# Patient Record
Sex: MALE | Race: WHITE | NOT HISPANIC OR LATINO | Employment: OTHER | URBAN - NONMETROPOLITAN AREA
[De-identification: names, ages, dates, MRNs, and addresses within clinical notes are randomized per-mention and may not be internally consistent; named-entity substitution may affect disease eponyms.]

---

## 2017-12-15 ENCOUNTER — HOSPITAL ENCOUNTER (INPATIENT)
Facility: HOSPITAL | Age: 72
LOS: 4 days | Discharge: ANOTHER HOSPITAL | End: 2017-12-19
Attending: PSYCHIATRY & NEUROLOGY | Admitting: PSYCHIATRY & NEUROLOGY

## 2017-12-15 ENCOUNTER — APPOINTMENT (OUTPATIENT)
Dept: GENERAL RADIOLOGY | Facility: HOSPITAL | Age: 72
End: 2017-12-15

## 2017-12-15 ENCOUNTER — HOSPITAL ENCOUNTER (EMERGENCY)
Facility: HOSPITAL | Age: 72
Discharge: PSYCHIATRIC OR SUBSTANCE USE FACILITY | End: 2017-12-15
Attending: EMERGENCY MEDICINE | Admitting: EMERGENCY MEDICINE

## 2017-12-15 VITALS
OXYGEN SATURATION: 97 % | HEIGHT: 66 IN | BODY MASS INDEX: 22.5 KG/M2 | RESPIRATION RATE: 18 BRPM | TEMPERATURE: 98 F | SYSTOLIC BLOOD PRESSURE: 122 MMHG | DIASTOLIC BLOOD PRESSURE: 79 MMHG | HEART RATE: 81 BPM | WEIGHT: 140 LBS

## 2017-12-15 DIAGNOSIS — F20.9 SCHIZOPHRENIA, UNSPECIFIED TYPE (HCC): Primary | ICD-10-CM

## 2017-12-15 DIAGNOSIS — D64.9 ANEMIA, UNSPECIFIED TYPE: ICD-10-CM

## 2017-12-15 LAB
ALBUMIN SERPL-MCNC: 4.2 G/DL (ref 3.4–4.8)
ALBUMIN/GLOB SERPL: 1.3 G/DL (ref 1.1–1.8)
ALP SERPL-CCNC: 76 U/L (ref 38–126)
ALT SERPL W P-5'-P-CCNC: 39 U/L (ref 21–72)
AMPHET+METHAMPHET UR QL: NEGATIVE
ANION GAP SERPL CALCULATED.3IONS-SCNC: 11 MMOL/L (ref 5–15)
APAP SERPL-MCNC: <10 MCG/ML (ref 10–30)
AST SERPL-CCNC: 46 U/L (ref 17–59)
BARBITURATES UR QL SCN: NEGATIVE
BASOPHILS # BLD AUTO: 0.02 10*3/MM3 (ref 0–0.2)
BASOPHILS NFR BLD AUTO: 0.2 % (ref 0–2)
BENZODIAZ UR QL SCN: NEGATIVE
BILIRUB SERPL-MCNC: 0.7 MG/DL (ref 0.2–1.3)
BILIRUB UR QL STRIP: NEGATIVE
BUN BLD-MCNC: 13 MG/DL (ref 7–21)
BUN/CREAT SERPL: 12 (ref 7–25)
CALCIUM SPEC-SCNC: 9.1 MG/DL (ref 8.4–10.2)
CANNABINOIDS SERPL QL: NEGATIVE
CHLORIDE SERPL-SCNC: 103 MMOL/L (ref 95–110)
CLARITY UR: CLEAR
CO2 SERPL-SCNC: 25 MMOL/L (ref 22–31)
COCAINE UR QL: NEGATIVE
COLOR UR: YELLOW
CREAT BLD-MCNC: 1.08 MG/DL (ref 0.7–1.3)
DEPRECATED RDW RBC AUTO: 47.3 FL (ref 35.1–43.9)
EOSINOPHIL # BLD AUTO: 0.16 10*3/MM3 (ref 0–0.7)
EOSINOPHIL NFR BLD AUTO: 1.8 % (ref 0–7)
ERYTHROCYTE [DISTWIDTH] IN BLOOD BY AUTOMATED COUNT: 13.6 % (ref 11.5–14.5)
ETHANOL BLD-MCNC: <10 MG/DL (ref 0–10)
ETHANOL UR QL: <0.01 %
GFR SERPL CREATININE-BSD FRML MDRD: 67 ML/MIN/1.73 (ref 42–98)
GLOBULIN UR ELPH-MCNC: 3.2 GM/DL (ref 2.3–3.5)
GLUCOSE BLD-MCNC: 85 MG/DL (ref 60–100)
GLUCOSE UR STRIP-MCNC: NEGATIVE MG/DL
HCT VFR BLD AUTO: 33.2 % (ref 39–49)
HGB BLD-MCNC: 10.9 G/DL (ref 13.7–17.3)
HGB UR QL STRIP.AUTO: NEGATIVE
IMM GRANULOCYTES # BLD: 0.02 10*3/MM3 (ref 0–0.02)
IMM GRANULOCYTES NFR BLD: 0.2 % (ref 0–0.5)
KETONES UR QL STRIP: NEGATIVE
LEUKOCYTE ESTERASE UR QL STRIP.AUTO: NEGATIVE
LYMPHOCYTES # BLD AUTO: 1.36 10*3/MM3 (ref 0.6–4.2)
LYMPHOCYTES NFR BLD AUTO: 15.4 % (ref 10–50)
MCH RBC QN AUTO: 31 PG (ref 26.5–34)
MCHC RBC AUTO-ENTMCNC: 32.8 G/DL (ref 31.5–36.3)
MCV RBC AUTO: 94.3 FL (ref 80–98)
METHADONE UR QL SCN: NEGATIVE
MONOCYTES # BLD AUTO: 1.08 10*3/MM3 (ref 0–0.9)
MONOCYTES NFR BLD AUTO: 12.2 % (ref 0–12)
NEUTROPHILS # BLD AUTO: 6.19 10*3/MM3 (ref 2–8.6)
NEUTROPHILS NFR BLD AUTO: 70.2 % (ref 37–80)
NITRITE UR QL STRIP: NEGATIVE
OPIATES UR QL: NEGATIVE
OXYCODONE UR QL SCN: NEGATIVE
PH UR STRIP.AUTO: 7 [PH] (ref 5–9)
PLATELET # BLD AUTO: 176 10*3/MM3 (ref 150–450)
PMV BLD AUTO: 10.7 FL (ref 8–12)
POTASSIUM BLD-SCNC: 4 MMOL/L (ref 3.5–5.1)
PROT SERPL-MCNC: 7.4 G/DL (ref 6.3–8.6)
PROT UR QL STRIP: NEGATIVE
RBC # BLD AUTO: 3.52 10*6/MM3 (ref 4.37–5.74)
SALICYLATES SERPL-MCNC: <1 MG/DL (ref 10–20)
SODIUM BLD-SCNC: 139 MMOL/L (ref 137–145)
SP GR UR STRIP: 1.01 (ref 1–1.03)
TSH SERPL DL<=0.05 MIU/L-ACNC: 4.06 MIU/ML (ref 0.46–4.68)
UROBILINOGEN UR QL STRIP: NORMAL
WBC NRBC COR # BLD: 8.83 10*3/MM3 (ref 3.2–9.8)
WHOLE BLOOD HOLD SPECIMEN: NORMAL

## 2017-12-15 PROCEDURE — 80307 DRUG TEST PRSMV CHEM ANLYZR: CPT | Performed by: EMERGENCY MEDICINE

## 2017-12-15 PROCEDURE — 99284 EMERGENCY DEPT VISIT MOD MDM: CPT

## 2017-12-15 PROCEDURE — 71010 HC CHEST PA OR AP: CPT

## 2017-12-15 PROCEDURE — 80053 COMPREHEN METABOLIC PANEL: CPT | Performed by: EMERGENCY MEDICINE

## 2017-12-15 PROCEDURE — 81003 URINALYSIS AUTO W/O SCOPE: CPT | Performed by: EMERGENCY MEDICINE

## 2017-12-15 PROCEDURE — 93005 ELECTROCARDIOGRAM TRACING: CPT | Performed by: EMERGENCY MEDICINE

## 2017-12-15 PROCEDURE — 85025 COMPLETE CBC W/AUTO DIFF WBC: CPT | Performed by: EMERGENCY MEDICINE

## 2017-12-15 PROCEDURE — 36415 COLL VENOUS BLD VENIPUNCTURE: CPT

## 2017-12-15 PROCEDURE — 93010 ELECTROCARDIOGRAM REPORT: CPT | Performed by: INTERNAL MEDICINE

## 2017-12-15 PROCEDURE — 84443 ASSAY THYROID STIM HORMONE: CPT | Performed by: EMERGENCY MEDICINE

## 2017-12-15 RX ORDER — OLANZAPINE 5 MG/1
5 TABLET ORAL NIGHTLY
COMMUNITY

## 2017-12-15 RX ORDER — METOPROLOL SUCCINATE 25 MG/1
12.5 TABLET, EXTENDED RELEASE ORAL DAILY
COMMUNITY

## 2017-12-15 RX ORDER — MIRTAZAPINE 30 MG/1
30 TABLET, FILM COATED ORAL NIGHTLY
COMMUNITY

## 2017-12-15 RX ORDER — TAMSULOSIN HYDROCHLORIDE 0.4 MG/1
1 CAPSULE ORAL NIGHTLY
COMMUNITY

## 2017-12-15 RX ORDER — MULTIPLE VITAMINS W/ MINERALS TAB 9MG-400MCG
1 TAB ORAL DAILY
COMMUNITY
End: 2017-12-22 | Stop reason: HOSPADM

## 2017-12-15 RX ORDER — CHLORAL HYDRATE 500 MG
CAPSULE ORAL
COMMUNITY
End: 2017-12-22 | Stop reason: HOSPADM

## 2017-12-15 RX ORDER — SERTRALINE HYDROCHLORIDE 100 MG/1
100 TABLET, FILM COATED ORAL 2 TIMES DAILY
COMMUNITY

## 2017-12-15 RX ORDER — POLYETHYLENE GLYCOL 3350 17 G/17G
17 POWDER, FOR SOLUTION ORAL 2 TIMES DAILY
COMMUNITY

## 2017-12-15 RX ORDER — SENNOSIDES 8.6 MG
TABLET ORAL NIGHTLY
COMMUNITY

## 2017-12-15 RX ORDER — ALBUTEROL SULFATE 90 UG/1
2 AEROSOL, METERED RESPIRATORY (INHALATION) EVERY 6 HOURS PRN
COMMUNITY

## 2017-12-15 RX ORDER — LEVOTHYROXINE SODIUM 0.03 MG/1
25 TABLET ORAL DAILY
COMMUNITY

## 2017-12-15 RX ORDER — OMEPRAZOLE 20 MG/1
20 CAPSULE, DELAYED RELEASE ORAL 2 TIMES DAILY
COMMUNITY

## 2017-12-15 RX ORDER — PRAVASTATIN SODIUM 20 MG
20 TABLET ORAL NIGHTLY
COMMUNITY

## 2017-12-15 RX ORDER — SODIUM PHOSPHATE,MONO-DIBASIC 19G-7G/118
ENEMA (ML) RECTAL
COMMUNITY
End: 2017-12-22 | Stop reason: HOSPADM

## 2017-12-15 RX ORDER — AMIODARONE HYDROCHLORIDE 200 MG/1
200 TABLET ORAL DAILY
COMMUNITY

## 2017-12-15 RX ORDER — ANTACID TABLETS 648 MG/1
648 TABLET, CHEWABLE ORAL DAILY
COMMUNITY
End: 2017-12-22 | Stop reason: HOSPADM

## 2017-12-16 PROBLEM — F32.A DEPRESSION: Status: ACTIVE | Noted: 2017-12-16

## 2017-12-16 LAB
ARTICHOKE IGE QN: 77 MG/DL (ref 1–129)
CHOLEST SERPL-MCNC: 157 MG/DL (ref 0–199)
GLUCOSE P FAST SERPL-MCNC: 82 MG/DL (ref 60–110)
HDLC SERPL-MCNC: 54 MG/DL (ref 60–200)
LDLC/HDLC SERPL: 1.67 {RATIO} (ref 0–3.55)
TRIGL SERPL-MCNC: 64 MG/DL (ref 20–199)

## 2017-12-16 PROCEDURE — 80061 LIPID PANEL: CPT | Performed by: PSYCHIATRY & NEUROLOGY

## 2017-12-16 PROCEDURE — 93005 ELECTROCARDIOGRAM TRACING: CPT | Performed by: PSYCHIATRY & NEUROLOGY

## 2017-12-16 PROCEDURE — 25010000002 DIPHENHYDRAMINE PER 50 MG

## 2017-12-16 PROCEDURE — 25010000002 HALOPERIDOL LACTATE PER 5 MG: Performed by: PSYCHIATRY & NEUROLOGY

## 2017-12-16 PROCEDURE — 82947 ASSAY GLUCOSE BLOOD QUANT: CPT | Performed by: PSYCHIATRY & NEUROLOGY

## 2017-12-16 PROCEDURE — 25010000002 LORAZEPAM PER 2 MG: Performed by: PSYCHIATRY & NEUROLOGY

## 2017-12-16 PROCEDURE — 99232 SBSQ HOSP IP/OBS MODERATE 35: CPT | Performed by: FAMILY MEDICINE

## 2017-12-16 RX ORDER — HALOPERIDOL 5 MG/ML
5 INJECTION INTRAMUSCULAR ONCE
Status: COMPLETED | OUTPATIENT
Start: 2017-12-16 | End: 2017-12-16

## 2017-12-16 RX ORDER — CLONIDINE HYDROCHLORIDE 0.1 MG/1
0.1 TABLET ORAL EVERY 4 HOURS PRN
Status: DISCONTINUED | OUTPATIENT
Start: 2017-12-16 | End: 2017-12-19 | Stop reason: HOSPADM

## 2017-12-16 RX ORDER — HALOPERIDOL 5 MG/ML
5 INJECTION INTRAMUSCULAR ONCE
Status: COMPLETED | OUTPATIENT
Start: 2017-12-17 | End: 2017-12-17

## 2017-12-16 RX ORDER — ALUMINA, MAGNESIA, AND SIMETHICONE 2400; 2400; 240 MG/30ML; MG/30ML; MG/30ML
15 SUSPENSION ORAL EVERY 6 HOURS PRN
Status: DISCONTINUED | OUTPATIENT
Start: 2017-12-16 | End: 2017-12-19 | Stop reason: HOSPADM

## 2017-12-16 RX ORDER — LORAZEPAM 2 MG/ML
2 INJECTION INTRAMUSCULAR ONCE
Status: COMPLETED | OUTPATIENT
Start: 2017-12-16 | End: 2017-12-16

## 2017-12-16 RX ORDER — DIVALPROEX SODIUM 125 MG/1
125 CAPSULE, COATED PELLETS ORAL EVERY 12 HOURS SCHEDULED
Status: DISCONTINUED | OUTPATIENT
Start: 2017-12-16 | End: 2017-12-19 | Stop reason: HOSPADM

## 2017-12-16 RX ORDER — DIPHENHYDRAMINE HYDROCHLORIDE 50 MG/ML
50 INJECTION INTRAMUSCULAR; INTRAVENOUS ONCE
Status: DISCONTINUED | OUTPATIENT
Start: 2017-12-16 | End: 2017-12-16

## 2017-12-16 RX ORDER — LORAZEPAM 2 MG/ML
2 INJECTION INTRAMUSCULAR ONCE
Status: COMPLETED | OUTPATIENT
Start: 2017-12-17 | End: 2017-12-17

## 2017-12-16 RX ORDER — HYDROXYZINE PAMOATE 50 MG/1
50 CAPSULE ORAL EVERY 6 HOURS PRN
Status: DISCONTINUED | OUTPATIENT
Start: 2017-12-16 | End: 2017-12-19 | Stop reason: HOSPADM

## 2017-12-16 RX ORDER — HALOPERIDOL 5 MG/ML
INJECTION INTRAMUSCULAR
Status: DISPENSED
Start: 2017-12-16 | End: 2017-12-17

## 2017-12-16 RX ORDER — LOPERAMIDE HYDROCHLORIDE 2 MG/1
2 CAPSULE ORAL 4 TIMES DAILY PRN
Status: DISCONTINUED | OUTPATIENT
Start: 2017-12-16 | End: 2017-12-19 | Stop reason: HOSPADM

## 2017-12-16 RX ORDER — TRAZODONE HYDROCHLORIDE 50 MG/1
50 TABLET ORAL NIGHTLY PRN
Status: DISCONTINUED | OUTPATIENT
Start: 2017-12-16 | End: 2017-12-19 | Stop reason: HOSPADM

## 2017-12-16 RX ORDER — ACETAMINOPHEN 325 MG/1
650 TABLET ORAL EVERY 4 HOURS PRN
Status: DISCONTINUED | OUTPATIENT
Start: 2017-12-16 | End: 2017-12-19 | Stop reason: HOSPADM

## 2017-12-16 RX ORDER — DIPHENHYDRAMINE HYDROCHLORIDE 50 MG/ML
50 INJECTION INTRAMUSCULAR; INTRAVENOUS ONCE
Status: COMPLETED | OUTPATIENT
Start: 2017-12-17 | End: 2017-12-17

## 2017-12-16 RX ORDER — DIPHENHYDRAMINE HYDROCHLORIDE 50 MG/ML
INJECTION INTRAMUSCULAR; INTRAVENOUS
Status: COMPLETED
Start: 2017-12-16 | End: 2017-12-16

## 2017-12-16 RX ADMIN — TRAZODONE HYDROCHLORIDE 50 MG: 50 TABLET ORAL at 22:23

## 2017-12-16 RX ADMIN — HYDROXYZINE PAMOATE 50 MG: 50 CAPSULE ORAL at 22:23

## 2017-12-16 RX ADMIN — DIPHENHYDRAMINE HYDROCHLORIDE 50 MG: 50 INJECTION INTRAMUSCULAR; INTRAVENOUS at 13:04

## 2017-12-16 RX ADMIN — LORAZEPAM 2 MG: 2 INJECTION INTRAMUSCULAR at 13:02

## 2017-12-16 RX ADMIN — HALOPERIDOL LACTATE 5 MG: 5 INJECTION, SOLUTION INTRAMUSCULAR at 13:05

## 2017-12-16 RX ADMIN — DIVALPROEX SODIUM 125 MG: 125 CAPSULE ORAL at 21:56

## 2017-12-16 NOTE — ED PROVIDER NOTES
"Subjective   HPI Comments: 73yo male pmh significant htn/hyperlipidemia/cad/hypothyroid/atrial fibrillation/schizophrenia/bph/ copd, presents ED for medical clearance for [facility] psych unit secondary to reported aggressive behavior at nursing home.  Pt denies physical complaints at time of exam.  ROS neg si/hi/av hallucination/etoh/illicit.  Pt does report \"mean\" residents at nursing home.    Patient is a 72 y.o. male presenting with mental health disorder.   Mental Health Problem   Presenting symptoms: aggressive behavior    Onset quality:  Sudden  Duration:  1 day  Chronicity:  New      Review of Systems   Unable to perform ROS: Psychiatric disorder       History reviewed. No pertinent past medical history.    No Known Allergies    History reviewed. No pertinent surgical history.    No family history on file.    Social History     Social History   • Marital status:      Spouse name: N/A   • Number of children: N/A   • Years of education: N/A     Social History Main Topics   • Smoking status: None   • Smokeless tobacco: None   • Alcohol use None   • Drug use: None   • Sexual activity: Not Asked     Other Topics Concern   • None     Social History Narrative   • None           Objective   Physical Exam   Constitutional: He is oriented to person, place, and time. He appears well-developed and well-nourished.   HENT:   Head: Normocephalic and atraumatic.   Nose: Nose normal.   Mouth/Throat: Oropharynx is clear and moist.   Eyes: Pupils are equal, round, and reactive to light.   Neck: Neck supple. No JVD present. No tracheal deviation present.   Cardiovascular: Normal rate, S1 normal, S2 normal and normal heart sounds.  An irregularly irregular rhythm present.   Pulmonary/Chest: Effort normal and breath sounds normal. He has no wheezes. He has no rales.   Abdominal: Soft. Bowel sounds are normal. There is no tenderness. There is no rebound and no guarding.   Musculoskeletal: He exhibits edema. He exhibits no " tenderness.   Lymphadenopathy:     He has no cervical adenopathy.   Neurological: He is alert and oriented to person, place, and time. GCS eye subscore is 4. GCS verbal subscore is 5. GCS motor subscore is 6.   Skin: Skin is warm and dry.   Psychiatric: His speech is normal. His affect is blunt. He is slowed. Cognition and memory are normal. He expresses no homicidal and no suicidal ideation.   Nursing note and vitals reviewed.      ECG 12 Lead    Date/Time: 12/15/2017 7:57 PM  Performed by: ARUN POLANCO  Authorized by: ARUN POLANCO   Interpreted by physician  Rhythm: sinus rhythm  Rate: normal  BPM: 83  QRS axis: left  Conduction: conduction normal  ST Segments: ST segments normal  T depression: aVL  Other findings: prolonged QTc interval  Clinical impression: abnormal ECG               ED Course  ED Course      Labs Reviewed   SALICYLATE LEVEL - Abnormal; Notable for the following:        Result Value    Salicylate <1.0 (*)     All other components within normal limits   ACETAMINOPHEN LEVEL - Abnormal; Notable for the following:     Acetaminophen <10.0 (*)     All other components within normal limits   CBC WITH AUTO DIFFERENTIAL - Abnormal; Notable for the following:     RBC 3.52 (*)     Hemoglobin 10.9 (*)     Hematocrit 33.2 (*)     RDW-SD 47.3 (*)     Monocyte % 12.2 (*)     Monocytes, Absolute 1.08 (*)     All other components within normal limits   COMPREHENSIVE METABOLIC PANEL - Normal    Narrative:     The MDRD GFR formula is only valid for adults with stable renal function between ages 18 and 70.   URINALYSIS W/ CULTURE IF INDICATED - Normal    Narrative:     Urine microscopic not indicated.   URINE DRUG SCREEN - Normal    Narrative:     Negative Thresholds For Drugs Screened in Urine:     Amphetamines          500 ng/ml  Barbiturates          200 ng/ml  Benzodiazepines       200 ng/ml  Cocaine               150 ng/ml  Methadone             300 ng/mL  Opiates               300 ng/mL  Oxycodone              100 ng/mL  THC                   20 ng/mL    The normal value for all drugs tested is negative. This report includes final unconfirmed screening results.  A positive result by this assay can be, at your request, sent to the Reference Lab for confirmation by gas chromatography. Unconfirmed results must not be used for non-medical purposes, such as employment or legal testing. Clinical consideration should be applied to any drug of abuse test result, particularly when unconfirmed results are used.   TSH - Normal   ETHANOL   CBC AND DIFFERENTIAL    Narrative:     The following orders were created for panel order CBC & Differential.  Procedure                               Abnormality         Status                     ---------                               -----------         ------                     CBC Auto Differential[[id number]]        Abnormal            Final result                 Please view results for these tests on the individual orders.   EXTRA TUBES    Narrative:     The following orders were created for panel order Extra Tubes.  Procedure                               Abnormality         Status                     ---------                               -----------         ------                     Light Blue Top[[id number]]                                   Final result                 Please view results for these tests on the individual orders.   LIGHT BLUE TOP     Xr Chest 1 View    Result Date: 12/15/2017  Narrative: PORTABLE CHEST HISTORY: COPD. Portable AP upright film of the chest was obtained at 7:23 PM. COMPARISON: None Chronic obstructive pulmonary disease. Linear atelectasis or scar left midlung. No focal infiltrate. Postoperative changes in the sternum and mediastinum. Right-sided pacemaker with leads projected over the right atrium and right ventricle. The heart is not enlarged. The pulmonary vasculature is not increased. No pleural effusion. No pneumothorax. No acute osseous  abnormality.     Impression: CONCLUSION: Chronic obstructive pulmonary disease. Linear atelectasis or scar left midlung. No focal infiltrate. Coronary artery bypass. Right-sided pacemaker. [id number] Electronically signed by:  Bj Armijo MD  12/15/2017 8:10 PM CST Workstation: [id number]    Final diagnoses:   Schizophrenia, unspecified type   Anemia, unspecified type            Guille Hennessy MD  12/15/17 [id number]

## 2017-12-16 NOTE — CONSULTS
CHIEF COMPLAINT/REASON FOR VISIT:  Agitation    HPI:  Patient presented to our ED with the above complaint on December 15 at almost 8 PM.  He was sent to the ER because of aggressive behavior at the nursing home.  The last nursing note we have from December 15 around 9 AM describes him as very elevated mood speaking very loudly and interacting with people pleasantly but exaggerated.  There were no notes of any physically aggressive behavior.    We have fragments of a hospitalization summary on the end of October of this year.  The last was November 17 which noted major depression, a subdural hygroma with no further details and noted that neurology and neurosurgery recommended against anticoagulation but this note is not completed nor signed.    We also have a note from October 30 from a second-year resident did list a urinary tract infection associated with an indwelling urinary catheter, BPH, paroxysmal atrial fibrillation, hypothyroidism, and hypertension.  The hygroma is not listed in this problem list.  We do have a note from October 26 that appears to be a full psychiatric note from a second-year resident, so perhaps this was a psychiatric admission.  Schizophrenia is in some of the problem list but not all of them.  One note does say he was living with his wife at home in [facility] prior to the admission in October.    PROBLEM LIST:  Patient Active Problem List    Diagnosis   • Depression [F32.9]         CURRENT MEDICATIONS:  Prescriptions Prior to Admission   Medication Sig Dispense Refill Last Dose   • albuterol (PROVENTIL HFA;VENTOLIN HFA) 108 (90 Base) MCG/ACT inhaler Inhale 2 puffs Every 6 (Six) Hours As Needed for Wheezing.   12/14/2017 at Unknown time   • amiodarone (PACERONE) 200 MG tablet Take 200 mg by mouth Daily.   12/14/2017 at Unknown time   • calcium carbonate 648 MG tablet tablet Take 648 mg by mouth Daily.   12/14/2017 at Unknown time   • Cholecalciferol (VITAMIN D3) 5000 units capsule  capsule Take 5,000 Units by mouth Daily.   12/14/2017 at Unknown time   • glucosamine-chondroitin 500-400 MG capsule capsule Take  by mouth 3 (Three) Times a Day With Meals.   12/14/2017 at Unknown time   • levothyroxine (SYNTHROID, LEVOTHROID) 25 MCG tablet Take 25 mcg by mouth Daily.   12/14/2017 at Unknown time   • metoprolol succinate XL (TOPROL-XL) 25 MG 24 hr tablet Take 12.5 mg by mouth Daily.   12/14/2017 at Unknown time   • mirtazapine (REMERON) 30 MG tablet Take 30 mg by mouth Every Night.   12/14/2017 at Unknown time   • Multiple Vitamins-Minerals (MULTIVITAMIN WITH MINERALS) tablet tablet Take 1 tablet by mouth Daily.   12/14/2017 at Unknown time   • OLANZapine (zyPREXA) 5 MG tablet Take 5 mg by mouth Every Night.   12/14/2017 at Unknown time   • Omega-3 Fatty Acids (FISH OIL) 1000 MG capsule capsule Take  by mouth Daily With Breakfast.   12/14/2017 at Unknown time   • omeprazole (priLOSEC) 20 MG capsule Take 20 mg by mouth 2 (Two) Times a Day.   12/14/2017 at Unknown time   • polyethylene glycol (MIRALAX) packet Take 17 g by mouth 2 (Two) Times a Day.   12/14/2017 at Unknown time   • pravastatin (PRAVACHOL) 20 MG tablet Take 20 mg by mouth Every Night.   12/14/2017 at Unknown time   • senna (SENOKOT) 8.6 MG tablet tablet Take  by mouth Every Night.   12/14/2017 at Unknown time   • sertraline (ZOLOFT) 100 MG tablet Take 100 mg by mouth 2 (Two) Times a Day.   12/14/2017 at Unknown time   • tamsulosin (FLOMAX) 0.4 MG capsule 24 hr capsule Take 1 capsule by mouth Every Night.   12/14/2017 at Unknown time       ALLERGIES:  Review of patient's allergies indicates no known allergies.      PAST MEDICAL/SURGICAL HISTORY:  Past Medical History:   Diagnosis Date   • Anxiety    • Cancer    • Depression    • Schizoaffective disorder        Past Surgical History:   Procedure Laterality Date   • CARDIAC SURGERY         Review of Systems   Constitutional: Negative for activity change, appetite change, fatigue and  "fever.        Patient does answer negatively to almost all questions, but clearly is not attending to each.   HENT: Negative for congestion, ear discharge, ear pain, facial swelling, hearing loss, nosebleeds, postnasal drip, rhinorrhea, sinus pressure, sore throat, tinnitus and trouble swallowing.    Eyes: Negative for pain, discharge and visual disturbance.   Respiratory: Negative for cough, shortness of breath and wheezing.    Cardiovascular: Negative for chest pain, palpitations and leg swelling.   Gastrointestinal: Negative for abdominal pain, blood in stool, constipation, diarrhea, nausea and vomiting.   Genitourinary: Negative for difficulty urinating, discharge, dysuria, flank pain, frequency, hematuria, penile pain, penile swelling, scrotal swelling, testicular pain and urgency.   Musculoskeletal: Positive for arthralgias. Negative for back pain, joint swelling, myalgias and neck pain.   Skin: Negative for rash and wound.   Neurological: Negative for dizziness, seizures, syncope, weakness, light-headedness and headaches.   Hematological: Negative for adenopathy.       Social History     Social History   • Marital status:      Spouse name: N/A   • Number of children: N/A   • Years of education: N/A     Occupational History   • Not on file.     Social History Main Topics   • Smoking status: Former Smoker   • Smokeless tobacco: Never Used   • Alcohol use Not on file   • Drug use: Not on file   • Sexual activity: Not on file     Other Topics Concern   • Not on file     Social History Narrative       History reviewed. No pertinent family history.          Objective     /63  Pulse 92  Temp 96.9 °F (36.1 °C) (Tympanic)   Resp 18  Ht 167.6 cm (66\")  Wt 63.5 kg (140 lb)  SpO2 96%  BMI 22.6 kg/m2    Physical Exam   Constitutional: He appears well-developed and well-nourished.   HENT:   Head: Normocephalic and atraumatic.   Eyes: Conjunctivae and EOM are normal.   Neck: Normal range of motion. Neck " supple. No thyromegaly present.   Cardiovascular: Normal rate, regular rhythm and normal heart sounds.  Exam reveals no gallop and no friction rub.    No murmur heard.  Pulmonary/Chest: Effort normal and breath sounds normal. No respiratory distress. He has no wheezes. He has no rales.   Abdominal: Soft. He exhibits no distension and no mass. There is no tenderness. There is no rebound and no guarding.   Musculoskeletal: Normal range of motion.   Lymphadenopathy:     He has no cervical adenopathy.   Neurological: He is alert. He has normal strength and normal reflexes. He displays no tremor. He exhibits normal muscle tone. Coordination normal. He displays no Babinski's sign on the right side. He displays no Babinski's sign on the left side.   Reflex Scores:       Tricep reflexes are 2+ on the right side and 2+ on the left side.       Bicep reflexes are 2+ on the right side and 2+ on the left side.       Brachioradialis reflexes are 2+ on the right side and 2+ on the left side.       Patellar reflexes are 2+ on the right side and 2+ on the left side.       Achilles reflexes are 2+ on the right side and 2+ on the left side.  Patient prefers to continue walking and gets agitated with any attempt for him to stop and focus on my request for neurological exam.  Precise testing of cranial nerves or sensation cannot be completed at and full strength cannot be tested but overall cranial nerves otherwise appear intact.   Skin: Skin is warm and dry. No rash noted. No erythema.   Nursing note and vitals reviewed.      Dystonia/Tardive Dyskinesia  Absent  Meningeal Signs  Absent    Diagnostic Studies  CBC, CMP,TSH, UDS, acetaminophen level, salicylate level, ethanol level, U/A all normal except  SALICYLATE LEVEL - Abnormal; Notable for the following:        Result Value      Salicylate <1.0 (*)       All other components within normal limits   ACETAMINOPHEN LEVEL - Abnormal; Notable for the following:      Acetaminophen <10.0  (*)       All other components within normal limits   CBC WITH AUTO DIFFERENTIAL - Abnormal; Notable for the following:      RBC 3.52 (*)       Hemoglobin 10.9 (*)       Hematocrit 33.2 (*)       RDW-SD 47.3 (*)       Monocyte % 12.2 (*)       Monocytes, Absolute 1.08 (*)       All other components within normal limits   COMPREHENSIVE METABOLIC PANEL - Normal     Narrative:      The MDRD GFR formula is only valid for adults with stable renal function between ages 18 and 70.   URINALYSIS W/ CULTURE IF INDICATED - Normal     Narrative:      Urine microscopic not indicated.   URINE DRUG SCREEN - Normal     TSH - Normal   ETHANOL less than 10      Chest x-ray 1 view:   CONCLUSION:  Chronic obstructive pulmonary disease.  Linear atelectasis or scar left midlung.  No focal infiltrate.  Coronary artery bypass.  Right-sided pacemaker.       EKG:  December 15 at almost 8 PM shows normal sinus rhythm left axis deviation QTC of 509.  EKG of December 16 at 7 AM shows atrial pacemaker left anterior fascicular block nonspecific ST-T wave changes and a QTC of 531.      Assessment/Plan     Patient Active Problem List    Diagnosis   • Depression [F32.9]     Atrial fibrillation which appears to be paroxysmal as he appears to be in normal sinus rhythm now.  Previous evaluation and apparently a history of a fall with a subdural hematoma as resulted in no anticoagulation being indicated.    Hygroma status post subdural hematoma, stable by history    BPH    History of CVA with right-sided weakness    Coronary artery disease with pacemaker in place.  At least one EKG did not show the pacer spike    Status post left nephrectomy    Hypertension    Hyperlipidemia    Hypothyroidism, with normal TSH now.    COPD    Anemia      Continue Home Meds as ordered. Mental health and pain issues managed per psychiatry.  Further diagnostic studies or intervention based on hospital course.

## 2017-12-16 NOTE — NURSING NOTE
Behavior   Feeling anxious and Sleep disturbance    Pain 0  AVH no  S/I no  H/I no  Affect anxious    Intervention  Medications reviewed   Assessment complete  Attempt to redirect and sit with patient    Safety considerations given.  Closely monitor for fear of hurting himself or others.         Response  He is exteremly irritated and refuses to be redirected he refuses food and medications.  He throws his food and wants me to give him more things to throw.  He is difficult to stay dressed.  His  came to visit him and he states he has never seen him like this before.  He moves chairs from areas and into others rooms.        Plan  Will promote and reinforce current treatment plan and encourage involvement in care plan goals.   Will provide for safe, calm, quiet environment.  Will promote open communication with staff and foster a trusting/working relationship with patient.   Will promote participation in groups and therapies and independent reflection.

## 2017-12-16 NOTE — NURSING NOTE
Pt. Refused any clonidine PRN, pt. Blood pressure has gone down since and he has no complaints at this time.

## 2017-12-16 NOTE — NURSING NOTE
Patient had been resting well since IM medication but has not woke up and is being intrusive to others. He is eating his supper and he is not throwing things at this time.   I called and spoke with his grandson (nancy) and he states this is all new behavior for him. That he has usually been the opposite of this behavior.    He has attempted to put on a few different pairs of pants on at one time.   His POA is his brother, Cameron, I attempted to call him for consent and it went to voicemail. I left a message and as of now I have not received a call back. Will continue to attempt to connect to him.

## 2017-12-16 NOTE — NURSING NOTE
Patient arrived to the floor per wheel chair escorted by security and an ED tech. Patient is disoriented to time, place, situation. Patient is calm and pleasant but seems to be extremely tired, he demonstrates this by yawning frequently. I left to take care of situation that was going on at the time of his arrival and upon my return to his room for an interview, patient was asleep with audible snoring. Patient is here for depression and being agitated/aggressive with staff and peers.

## 2017-12-16 NOTE — NURSING NOTE
Dr Johns ROS     Patient has a past medical hx of cva with right sided weakness, copd , afib, htn, high cholesterol and CABG      General  NONE    Eyes   glasses/contact lens    ENT/Mouth   None    Cardio   None    Resp   None    GI    None       None    MS    Muscle or Joint weakness    Skin/Hair/Nails   None    Neuro   None

## 2017-12-16 NOTE — PLAN OF CARE
Problem: BH Patient Care Overview (Adult)  Goal: Individualization and Mutuality  Outcome: Ongoing (interventions implemented as appropriate)  Goal: Discharge Needs Assessment  Outcome: Ongoing (interventions implemented as appropriate)    Problem:  Overarching Goals  Goal: Adheres to Safety Considerations for Self and Others  Outcome: Ongoing (interventions implemented as appropriate)  Goal: Optimized Coping Skills in Response to Life Stressors  Outcome: Ongoing (interventions implemented as appropriate)  Goal: Develops/Participates in Therapeutic [place] to Support Successful Transition  Outcome: Ongoing (interventions implemented as appropriate)

## 2017-12-17 PROCEDURE — 25010000002 DIPHENHYDRAMINE PER 50 MG: Performed by: PSYCHIATRY & NEUROLOGY

## 2017-12-17 PROCEDURE — 25010000002 HALOPERIDOL LACTATE PER 5 MG: Performed by: PSYCHIATRY & NEUROLOGY

## 2017-12-17 PROCEDURE — 25010000002 LORAZEPAM PER 2 MG: Performed by: PSYCHIATRY & NEUROLOGY

## 2017-12-17 RX ORDER — MELATONIN
5000 DAILY
Status: DISCONTINUED | OUTPATIENT
Start: 2017-12-17 | End: 2017-12-19 | Stop reason: HOSPADM

## 2017-12-17 RX ORDER — ATORVASTATIN CALCIUM 10 MG/1
10 TABLET, FILM COATED ORAL DAILY
Status: DISCONTINUED | OUTPATIENT
Start: 2017-12-17 | End: 2017-12-19 | Stop reason: HOSPADM

## 2017-12-17 RX ORDER — LEVOTHYROXINE SODIUM 0.03 MG/1
25 TABLET ORAL DAILY
Status: DISCONTINUED | OUTPATIENT
Start: 2017-12-17 | End: 2017-12-19 | Stop reason: HOSPADM

## 2017-12-17 RX ORDER — SENNA PLUS 8.6 MG/1
1 TABLET ORAL NIGHTLY PRN
Status: DISCONTINUED | OUTPATIENT
Start: 2017-12-17 | End: 2017-12-19 | Stop reason: HOSPADM

## 2017-12-17 RX ORDER — PANTOPRAZOLE SODIUM 40 MG/1
40 TABLET, DELAYED RELEASE ORAL
Status: DISCONTINUED | OUTPATIENT
Start: 2017-12-17 | End: 2017-12-19 | Stop reason: HOSPADM

## 2017-12-17 RX ORDER — AMIODARONE HYDROCHLORIDE 200 MG/1
200 TABLET ORAL
Status: DISCONTINUED | OUTPATIENT
Start: 2017-12-17 | End: 2017-12-19 | Stop reason: HOSPADM

## 2017-12-17 RX ORDER — TAMSULOSIN HYDROCHLORIDE 0.4 MG/1
0.4 CAPSULE ORAL DAILY
Status: DISCONTINUED | OUTPATIENT
Start: 2017-12-17 | End: 2017-12-19 | Stop reason: HOSPADM

## 2017-12-17 RX ORDER — METOPROLOL SUCCINATE 25 MG/1
25 TABLET, EXTENDED RELEASE ORAL
Status: DISCONTINUED | OUTPATIENT
Start: 2017-12-17 | End: 2017-12-19 | Stop reason: HOSPADM

## 2017-12-17 RX ORDER — OLANZAPINE 5 MG/1
5 TABLET ORAL NIGHTLY
Status: DISCONTINUED | OUTPATIENT
Start: 2017-12-17 | End: 2017-12-19 | Stop reason: HOSPADM

## 2017-12-17 RX ORDER — MIRTAZAPINE 15 MG/1
30 TABLET, FILM COATED ORAL NIGHTLY
Status: DISCONTINUED | OUTPATIENT
Start: 2017-12-17 | End: 2017-12-19 | Stop reason: HOSPADM

## 2017-12-17 RX ADMIN — VITAMIN D, TAB 1000IU (100/BT) 5000 UNITS: 25 TAB at 08:59

## 2017-12-17 RX ADMIN — LORAZEPAM 2 MG: 2 INJECTION INTRAMUSCULAR; INTRAVENOUS at 00:11

## 2017-12-17 RX ADMIN — ATORVASTATIN CALCIUM 10 MG: 10 TABLET, FILM COATED ORAL at 09:00

## 2017-12-17 RX ADMIN — HALOPERIDOL LACTATE 5 MG: 5 INJECTION, SOLUTION INTRAMUSCULAR at 00:11

## 2017-12-17 RX ADMIN — PANTOPRAZOLE SODIUM 40 MG: 40 TABLET, DELAYED RELEASE ORAL at 09:18

## 2017-12-17 RX ADMIN — Medication 1 TABLET: at 09:00

## 2017-12-17 RX ADMIN — SERTRALINE HYDROCHLORIDE 50 MG: 50 TABLET ORAL at 09:00

## 2017-12-17 RX ADMIN — TRAZODONE HYDROCHLORIDE 50 MG: 50 TABLET ORAL at 20:43

## 2017-12-17 RX ADMIN — DIPHENHYDRAMINE HYDROCHLORIDE 50 MG: 50 INJECTION INTRAMUSCULAR; INTRAVENOUS at 00:09

## 2017-12-17 RX ADMIN — DIVALPROEX SODIUM 125 MG: 125 CAPSULE ORAL at 09:00

## 2017-12-17 RX ADMIN — HYDROXYZINE PAMOATE 50 MG: 50 CAPSULE ORAL at 09:00

## 2017-12-17 RX ADMIN — LEVOTHYROXINE SODIUM 25 MCG: 25 TABLET ORAL at 09:01

## 2017-12-17 RX ADMIN — DIVALPROEX SODIUM 125 MG: 125 CAPSULE ORAL at 20:43

## 2017-12-17 RX ADMIN — AMIODARONE HYDROCHLORIDE 200 MG: 200 TABLET ORAL at 08:59

## 2017-12-17 RX ADMIN — MIRTAZAPINE 30 MG: 15 TABLET, FILM COATED ORAL at 20:43

## 2017-12-17 RX ADMIN — OLANZAPINE 5 MG: 5 TABLET, FILM COATED ORAL at 20:43

## 2017-12-17 RX ADMIN — POLYETHYLENE GLYCOL 3350 17 G: 17 POWDER, FOR SOLUTION ORAL at 09:00

## 2017-12-17 RX ADMIN — METOPROLOL SUCCINATE 25 MG: 25 TABLET, EXTENDED RELEASE ORAL at 09:00

## 2017-12-17 RX ADMIN — TAMSULOSIN HYDROCHLORIDE 0.4 MG: 0.4 CAPSULE ORAL at 09:00

## 2017-12-17 NOTE — NURSING NOTE
He has been much more quiet and calm today.  He was compliant with medications, he has not been moving and pushing chairs like he had been.  He had a shower today and was compliant with care. His wife called and states that she hopes he comes home soon (nursing home) so that she can visit.    She states she has attempted to contact the brother (Guardian) and has not had any luck as well.   He took his medications and wanted to drink a full cup of water with each pill (there is about 10) and then he would turn the cup over, almost as a ritual.  He is still confused, but he does try to talk to others. He did make a joke this morning that was appropriate.    Continue to monitor and provide for needs.

## 2017-12-17 NOTE — PLAN OF CARE
Problem: Depression  Goal: Treatment Goal: Demonstrate behavioral control of depressive symptoms, verbalize feelings of improved mood/affect, and adopt new coping skills prior to discharge  Outcome: Ongoing (interventions implemented as appropriate)  Goal: Verbalize thoughts and feelings associated with:  Outcome: Ongoing (interventions implemented as appropriate)  Goal: Refrain from harming self  Outcome: Ongoing (interventions implemented as appropriate)  Goal: Refrain from isolation  Outcome: Ongoing (interventions implemented as appropriate)  Goal: Refrain from self-neglect  Outcome: Ongoing (interventions implemented as appropriate)  Goal: Attend and participate in unit activities, including therapeutic, recreational, and educational groups  Outcome: Ongoing (interventions implemented as appropriate)  Goal: Complete daily ADLs, including personal hygiene independently, as able  Outcome: Ongoing (interventions implemented as appropriate)

## 2017-12-17 NOTE — PLAN OF CARE
Problem: BH Patient Care Overview (Adult)  Goal: Individualization and Mutuality  Outcome: Ongoing (interventions implemented as appropriate)  Goal: Discharge Needs Assessment  Outcome: Ongoing (interventions implemented as appropriate)    Problem:  Overarching Goals  Goal: Adheres to Safety Considerations for Self and Others  Outcome: Ongoing (interventions implemented as appropriate)  Goal: Optimized Coping Skills in Response to Life Stressors  Outcome: Ongoing (interventions implemented as appropriate)  Goal: Develops/Participates in Therapeutic [place] to Support Successful Transition  Outcome: Ongoing (interventions implemented as appropriate)

## 2017-12-17 NOTE — NURSING NOTE
Patient has been very energetic today. He moves around and is taking chairs and blankets and moving them around. He is eating and taking his medications today. His wife stated that she wants him back at [place] because he is too far away here.

## 2017-12-17 NOTE — NURSING NOTE
Behavior   Feeling anxious  insomnia  Pain 0  AVH no  S/I no  H/I no  Affect flat    Patient is very disruptive with the other patients tonight. He's moving all of the chairs in the dayroom all over it. He has attempted to take things from his peers that doesn't belong to him. He has requested water a couple of times but he drinks most of the water then when he's done just throws the rest of the water onto the floor. He has taken all of the linen off his bed and placed it all over the dayroom even in front of patient's rooms. His speech is clear/loud most of the time, appearance is unkempt, makes appropriate eye contact. The MD is here in the dayroom now to see this patient .                Intervention  Medications reviewed and administered  Assessment complete    Response  Verbalized understanding   Took medications  Interacted with assessment    Plan  Will promote and reinforce current treatment plan and encourage involvement in care plan goals.   Will provide for safe, calm, quiet environment.  Will promote open communication with staff and foster a trusting/working relationship with patient.   Will promote participation in groups and therapies and independent reflection.

## 2017-12-18 ENCOUNTER — APPOINTMENT (OUTPATIENT)
Dept: GENERAL RADIOLOGY | Facility: HOSPITAL | Age: 72
End: 2017-12-18

## 2017-12-18 VITALS
OXYGEN SATURATION: 95 % | HEART RATE: 92 BPM | WEIGHT: 140 LBS | RESPIRATION RATE: 20 BRPM | SYSTOLIC BLOOD PRESSURE: 166 MMHG | HEIGHT: 66 IN | TEMPERATURE: 97.3 F | BODY MASS INDEX: 22.5 KG/M2 | DIASTOLIC BLOOD PRESSURE: 86 MMHG

## 2017-12-18 PROBLEM — F03.918 DEMENTIA WITH BEHAVIORAL DISTURBANCE (HCC): Status: ACTIVE | Noted: 2017-12-18

## 2017-12-18 PROCEDURE — 73501 X-RAY EXAM HIP UNI 1 VIEW: CPT

## 2017-12-18 PROCEDURE — 99232 SBSQ HOSP IP/OBS MODERATE 35: CPT | Performed by: PSYCHIATRY & NEUROLOGY

## 2017-12-18 RX ADMIN — METOPROLOL SUCCINATE 25 MG: 25 TABLET, EXTENDED RELEASE ORAL at 08:10

## 2017-12-18 RX ADMIN — ATORVASTATIN CALCIUM 10 MG: 10 TABLET, FILM COATED ORAL at 08:10

## 2017-12-18 RX ADMIN — Medication 1 TABLET: at 08:10

## 2017-12-18 RX ADMIN — DIVALPROEX SODIUM 125 MG: 125 CAPSULE ORAL at 20:19

## 2017-12-18 RX ADMIN — OLANZAPINE 5 MG: 5 TABLET, FILM COATED ORAL at 20:19

## 2017-12-18 RX ADMIN — PANTOPRAZOLE SODIUM 40 MG: 40 TABLET, DELAYED RELEASE ORAL at 08:10

## 2017-12-18 RX ADMIN — LEVOTHYROXINE SODIUM 25 MCG: 25 TABLET ORAL at 08:10

## 2017-12-18 RX ADMIN — TRAZODONE HYDROCHLORIDE 50 MG: 50 TABLET ORAL at 20:19

## 2017-12-18 RX ADMIN — DIVALPROEX SODIUM 125 MG: 125 CAPSULE ORAL at 08:10

## 2017-12-18 RX ADMIN — MIRTAZAPINE 30 MG: 15 TABLET, FILM COATED ORAL at 20:19

## 2017-12-18 RX ADMIN — SERTRALINE HYDROCHLORIDE 50 MG: 50 TABLET ORAL at 08:10

## 2017-12-18 RX ADMIN — AMIODARONE HYDROCHLORIDE 200 MG: 200 TABLET ORAL at 08:09

## 2017-12-18 RX ADMIN — VITAMIN D, TAB 1000IU (100/BT) 5000 UNITS: 25 TAB at 08:10

## 2017-12-18 RX ADMIN — TAMSULOSIN HYDROCHLORIDE 0.4 MG: 0.4 CAPSULE ORAL at 08:10

## 2017-12-18 NOTE — NURSING NOTE
"Behavior   Anxiety: Patient denies anxiety  Depression: Patient denies depression  Pain   0  AVH   Patient denies AVH  S/I   no  H/I   no  Affect   calm and pleasant    Patient assessed in room.  Incontinence care is provided and brief changed; patient is calm and cooperative with care.  Patient seems to be in a happy mood; he is laughing and smiling.  He reports having a good day and states he ate well; apart from breakfast.  He is compliant with medication pass and inquires about his medications appropriately.  He is alert to his person, , current date, and scenario tonight.  He states, \"I'm in [facility], today is December the ..\"    No new needs at this time.    Intervention  Medications reviewed and administered  Assessment complete    Response  Verbalized understanding   Took medications  Interacted with assessment    Plan  Will promote and reinforce current treatment plan and encourage involvement in care plan goals.   Will provide for safe, calm, quiet environment.  Will promote open communication with staff and foster a trusting/working relationship with patient.   Will promote participation in groups and therapies and independent reflection.      "

## 2017-12-18 NOTE — NURSING NOTE
Behavior   Anxiety: Difficulty concentrating  Depression: difficulty concentrating  Pain  0  AVH   no  S/I   no  H/I   no    Affect   calm and pleasant    Note:      Intervention  Instructed in medication usage and effects  Medications administered as ordered  Encouraged to verbalize needs      Response  Verbalized understanding   Did patient take medications as ordered yes   Did patient interact with assessment?  yes     Plan  Will monitor for safety  Will monitor every 15 minutes as ordered  Will evaluate and promote the plan of care

## 2017-12-18 NOTE — PLAN OF CARE
Problem: BH Patient Care Overview (Adult)  Goal: Individualization and Mutuality  Outcome: Ongoing (interventions implemented as appropriate)  Goal: Discharge Needs Assessment  Outcome: Ongoing (interventions implemented as appropriate)    Problem:  Overarching Goals  Goal: Adheres to Safety Considerations for Self and Others  Outcome: Ongoing (interventions implemented as appropriate)  Goal: Optimized Coping Skills in Response to Life Stressors  Outcome: Ongoing (interventions implemented as appropriate)  Goal: Develops/Participates in Therapeutic [place] to Support Successful Transition  Outcome: Ongoing (interventions implemented as appropriate)

## 2017-12-18 NOTE — PROGRESS NOTES
"The patient was seen for approximately 15 minutes.  He has done much better since yesterday and has been taking his medicines, which he initially refused.  He said he slept well last night.  He states \"always on my shoes and socks.    Past status examination: The patient is cooperative and makes good eye contact.  There is a slight tremor present.  He still tends to hold up fingers to me.  His mood is good.  His affect is bright.  His speech is normal.  His thought form was unremarkable.  His thought content is somewhat difficult to follow.  There is no suicidal ideation.  He does mention a Phan Moore again but he does not say that he wants to hurt this person.  He also states \"I hear voices.\"  He also states he hears Mr. Moore saying \"sometimes that remote disappears.\"  He also says he has heard voices in the nursing home.    Cognition: The patient noticed the Trump and Obama are presidents.  He can abstractly interpret the proverb \"don't cry over spilt milk.\"  He knows an apple and an orange are both fruit.  He can subtract serial threes.  He can recall 3 out of 3 objects immediately and 2 out of 3 objects in 1 minute.  Reliably ability, insight, judgment are poor.  Impulse control is unspecified at this time.    Assessment: Overall better.    Plan: Continue medications as charted.  Inquire from the family regarding recent stressors.  "

## 2017-12-18 NOTE — H&P
"This is a 72-year-old white male who was referred from a nursing home because of depression and aggressive behavior.  His grandson since this is all new behavior for him and he is usually the opposite of this.  The patient said he did not want to go back to the nursing home.  He states that he was in the  and had 3 dishonorable discharge.    Past psychiatric history: Unavailable at this time.    Past medical history: The patient has probable atrial fibrillation.Hygroma and subdural hematoma.  He has a stroke with right-sided weakness.  He has coronary artery disease and is status post pacemaker placement.  He is status post left nephrectomy.  He has hypertension.  He has elevated lipids.  He has hypothyroidism with a normal TSH.      Family history: Unremarkable.    Social history: Patient lives in the nursing home.  His brother is his power of .  Otherwise no further information is available.    Mental status examination.  The patient was seen after he was given some medicines for agitation.  At this point his cooperation was good.  His eye contact was good.  He repeatedly saluted me and held up several fingers.  There may also be pill-rolling tremor.  He says he has not been sleeping well.  His mood is happy.  His affect is fairly bright.  His thought form was unremarkable.  His thought content is difficult to comprehend.  He denies thoughts of hurting himself.  He does say he has a problem with a Phan Moore, the  of a nursing home in [place].  He states \"I like to kill a few people.\"  He does apparently have auditory hallucinations.  On cognitive testing he knows the year and the date of the month.  He knows that apple and an orange are both fruit.  He cannot perform serial threes.    Physical examination: Please see Dr. Johns's note.    Assessment:    Axis I: Dementia NOS.  Probable depressed mood secondary to dementia.    Axis II: No obvious diagnosis.    Axis III: Multiple " medical problems.    Axis IV: A nursing home.    Axis V: Global assessment of functioning 45 now, 47 the past year.    Plan: I will admit to the [facility] with suicide precautions.  As mentioned above he has needed PRN meds it helped significantly.  I will continue his current medicines.  I will add Depakote sprinkles 125 mg twice a day in chocolate pudding.  I would like to get more collateral information.  He will probably need further workup for why he is had a abrupt change in his mental status.  Patient's reliability is poor insight is poor, judgment is poor and impulse control is fair.  Patient's strengths are that he has a supportive family and a place to live.  Patient's weakness is that he has chronic medical conditions any dementing condition.

## 2017-12-19 ENCOUNTER — APPOINTMENT (OUTPATIENT)
Dept: GENERAL RADIOLOGY | Facility: HOSPITAL | Age: 72
End: 2017-12-19

## 2017-12-19 ENCOUNTER — HOSPITAL ENCOUNTER (INPATIENT)
Facility: HOSPITAL | Age: 72
LOS: 3 days | Discharge: NURSING, REHAB OR LONG-TERM CARE | End: 2017-12-22
Attending: INTERNAL MEDICINE | Admitting: INTERNAL MEDICINE

## 2017-12-19 ENCOUNTER — DOCUMENTATION (OUTPATIENT)
Dept: PSYCHIATRY | Facility: HOSPITAL | Age: 72
End: 2017-12-19

## 2017-12-19 ENCOUNTER — ANESTHESIA EVENT (OUTPATIENT)
Dept: PERIOP | Facility: HOSPITAL | Age: 72
End: 2017-12-19

## 2017-12-19 ENCOUNTER — ANESTHESIA (OUTPATIENT)
Dept: PERIOP | Facility: HOSPITAL | Age: 72
End: 2017-12-19

## 2017-12-19 DIAGNOSIS — Z74.09 IMPAIRED PHYSICAL MOBILITY: ICD-10-CM

## 2017-12-19 DIAGNOSIS — S72.042A DISPLACED FRACTURE OF BASE OF NECK OF LEFT FEMUR, INITIAL ENCOUNTER FOR CLOSED FRACTURE (HCC): Primary | ICD-10-CM

## 2017-12-19 PROBLEM — S72.002A CLOSED LEFT HIP FRACTURE (HCC): Status: ACTIVE | Noted: 2017-12-19

## 2017-12-19 PROBLEM — Z86.79 HISTORY OF SUBDURAL HEMATOMA: Status: ACTIVE | Noted: 2017-12-19

## 2017-12-19 PROBLEM — I48.0 PAROXYSMAL ATRIAL FIBRILLATION (HCC): Status: ACTIVE | Noted: 2017-12-19

## 2017-12-19 PROBLEM — Z90.5 H/O UNILATERAL NEPHRECTOMY: Status: ACTIVE | Noted: 2017-12-19

## 2017-12-19 PROBLEM — I10 ESSENTIAL HYPERTENSION: Status: ACTIVE | Noted: 2017-12-19

## 2017-12-19 LAB
ABO GROUP BLD: NORMAL
ALBUMIN SERPL-MCNC: 3.9 G/DL (ref 3.4–4.8)
ALBUMIN/GLOB SERPL: 1.2 G/DL (ref 1.1–1.8)
ALP SERPL-CCNC: 77 U/L (ref 38–126)
ALT SERPL W P-5'-P-CCNC: 31 U/L (ref 21–72)
ANION GAP SERPL CALCULATED.3IONS-SCNC: 13 MMOL/L (ref 5–15)
AST SERPL-CCNC: 49 U/L (ref 17–59)
BILIRUB SERPL-MCNC: 0.6 MG/DL (ref 0.2–1.3)
BLD GP AB SCN SERPL QL: NEGATIVE
BUN BLD-MCNC: 20 MG/DL (ref 7–21)
BUN/CREAT SERPL: 17.5 (ref 7–25)
CALCIUM SPEC-SCNC: 8.8 MG/DL (ref 8.4–10.2)
CHLORIDE SERPL-SCNC: 103 MMOL/L (ref 95–110)
CO2 SERPL-SCNC: 25 MMOL/L (ref 22–31)
CREAT BLD-MCNC: 1.14 MG/DL (ref 0.7–1.3)
DEPRECATED RDW RBC AUTO: 47 FL (ref 35.1–43.9)
ERYTHROCYTE [DISTWIDTH] IN BLOOD BY AUTOMATED COUNT: 13.7 % (ref 11.5–14.5)
GFR SERPL CREATININE-BSD FRML MDRD: 63 ML/MIN/1.73 (ref 60–98)
GLOBULIN UR ELPH-MCNC: 3.2 GM/DL (ref 2.3–3.5)
GLUCOSE BLD-MCNC: 92 MG/DL (ref 60–100)
HCT VFR BLD AUTO: 31.5 % (ref 39–49)
HGB BLD-MCNC: 10.5 G/DL (ref 13.7–17.3)
INR PPP: 1.09 (ref 0.8–1.2)
Lab: NORMAL
MCH RBC QN AUTO: 31.4 PG (ref 26.5–34)
MCHC RBC AUTO-ENTMCNC: 33.3 G/DL (ref 31.5–36.3)
MCV RBC AUTO: 94.3 FL (ref 80–98)
PLATELET # BLD AUTO: 164 10*3/MM3 (ref 150–450)
PMV BLD AUTO: 11 FL (ref 8–12)
POTASSIUM BLD-SCNC: 4.1 MMOL/L (ref 3.5–5.1)
PROT SERPL-MCNC: 7.1 G/DL (ref 6.3–8.6)
PROTHROMBIN TIME: 14 SECONDS (ref 11.1–15.3)
RBC # BLD AUTO: 3.34 10*6/MM3 (ref 4.37–5.74)
RH BLD: POSITIVE
SODIUM BLD-SCNC: 141 MMOL/L (ref 137–145)
WBC NRBC COR # BLD: 9.22 10*3/MM3 (ref 3.2–9.8)

## 2017-12-19 PROCEDURE — C1713 ANCHOR/SCREW BN/BN,TIS/BN: HCPCS | Performed by: ORTHOPAEDIC SURGERY

## 2017-12-19 PROCEDURE — 85027 COMPLETE CBC AUTOMATED: CPT | Performed by: INTERNAL MEDICINE

## 2017-12-19 PROCEDURE — 25010000002 FENTANYL CITRATE (PF) 100 MCG/2ML SOLUTION: Performed by: NURSE ANESTHETIST, CERTIFIED REGISTERED

## 2017-12-19 PROCEDURE — 86900 BLOOD TYPING SEROLOGIC ABO: CPT

## 2017-12-19 PROCEDURE — 73501 X-RAY EXAM HIP UNI 1 VIEW: CPT

## 2017-12-19 PROCEDURE — 86901 BLOOD TYPING SEROLOGIC RH(D): CPT

## 2017-12-19 PROCEDURE — C1776 JOINT DEVICE (IMPLANTABLE): HCPCS | Performed by: ORTHOPAEDIC SURGERY

## 2017-12-19 PROCEDURE — 25010000002 DEXAMETHASONE PER 1 MG: Performed by: NURSE ANESTHETIST, CERTIFIED REGISTERED

## 2017-12-19 PROCEDURE — 99223 1ST HOSP IP/OBS HIGH 75: CPT | Performed by: ORTHOPAEDIC SURGERY

## 2017-12-19 PROCEDURE — 99238 HOSP IP/OBS DSCHRG MGMT 30/<: CPT | Performed by: PSYCHIATRY & NEUROLOGY

## 2017-12-19 PROCEDURE — 25010000002 PROPOFOL 10 MG/ML EMULSION: Performed by: NURSE ANESTHETIST, CERTIFIED REGISTERED

## 2017-12-19 PROCEDURE — 88305 TISSUE EXAM BY PATHOLOGIST: CPT | Performed by: ORTHOPAEDIC SURGERY

## 2017-12-19 PROCEDURE — 25010000002 MORPHINE PER 10 MG: Performed by: INTERNAL MEDICINE

## 2017-12-19 PROCEDURE — 86901 BLOOD TYPING SEROLOGIC RH(D): CPT | Performed by: ANESTHESIOLOGY

## 2017-12-19 PROCEDURE — A9270 NON-COVERED ITEM OR SERVICE: HCPCS | Performed by: INTERNAL MEDICINE

## 2017-12-19 PROCEDURE — 88305 TISSUE EXAM BY PATHOLOGIST: CPT | Performed by: PATHOLOGY

## 2017-12-19 PROCEDURE — 63710000001 OLANZAPINE 5 MG TABLET: Performed by: INTERNAL MEDICINE

## 2017-12-19 PROCEDURE — 27236 TREAT THIGH FRACTURE: CPT | Performed by: ORTHOPAEDIC SURGERY

## 2017-12-19 PROCEDURE — 86850 RBC ANTIBODY SCREEN: CPT | Performed by: ANESTHESIOLOGY

## 2017-12-19 PROCEDURE — 80053 COMPREHEN METABOLIC PANEL: CPT | Performed by: INTERNAL MEDICINE

## 2017-12-19 PROCEDURE — A9270 NON-COVERED ITEM OR SERVICE: HCPCS | Performed by: ORTHOPAEDIC SURGERY

## 2017-12-19 PROCEDURE — 25010000002 SUCCINYLCHOLINE PER 20 MG: Performed by: NURSE ANESTHETIST, CERTIFIED REGISTERED

## 2017-12-19 PROCEDURE — 85610 PROTHROMBIN TIME: CPT | Performed by: INTERNAL MEDICINE

## 2017-12-19 PROCEDURE — 88311 DECALCIFY TISSUE: CPT | Performed by: PATHOLOGY

## 2017-12-19 PROCEDURE — 25010000003 CEFAZOLIN PER 500 MG: Performed by: ORTHOPAEDIC SURGERY

## 2017-12-19 PROCEDURE — 25810000003 SODIUM CHLORIDE 0.9 % WITH KCL 20 MEQ 20-0.9 MEQ/L-% SOLUTION: Performed by: ORTHOPAEDIC SURGERY

## 2017-12-19 PROCEDURE — 63710000001 MIRTAZAPINE 15 MG TABLET: Performed by: INTERNAL MEDICINE

## 2017-12-19 PROCEDURE — 0SRS0JA REPLACEMENT OF LEFT HIP JOINT, FEMORAL SURFACE WITH SYNTHETIC SUBSTITUTE, UNCEMENTED, OPEN APPROACH: ICD-10-PCS | Performed by: ORTHOPAEDIC SURGERY

## 2017-12-19 PROCEDURE — 88311 DECALCIFY TISSUE: CPT | Performed by: ORTHOPAEDIC SURGERY

## 2017-12-19 PROCEDURE — 63710000001 DOCUSATE SODIUM 100 MG CAPSULE: Performed by: ORTHOPAEDIC SURGERY

## 2017-12-19 PROCEDURE — 86900 BLOOD TYPING SEROLOGIC ABO: CPT | Performed by: ANESTHESIOLOGY

## 2017-12-19 PROCEDURE — 63710000001 TAMSULOSIN 0.4 MG CAPSULE: Performed by: INTERNAL MEDICINE

## 2017-12-19 DEVICE — CORAIL HIP SYSTEM CEMENTLESS FEMORAL STEM 12/14 AMT 135 DEGREES KA SIZE 12 HA COATED STANDARD COLLAR
Type: IMPLANTABLE DEVICE | Site: HIP | Status: FUNCTIONAL
Brand: CORAIL

## 2017-12-19 DEVICE — ARTICUL/EZE FEMORAL HEAD DIAMETER 28MM +5 12/14 TAPER
Type: IMPLANTABLE DEVICE | Site: HIP | Status: FUNCTIONAL
Brand: ARTICUL/EZE

## 2017-12-19 DEVICE — SELF CENTERING BI-POLAR HEAD 28MM ID 50MM OD
Type: IMPLANTABLE DEVICE | Site: HIP | Status: FUNCTIONAL
Brand: SELF CENTERING

## 2017-12-19 DEVICE — PRT HIP BIPOL PRIM DEPUY 9525109/9525107: Type: IMPLANTABLE DEVICE | Site: HIP | Status: FUNCTIONAL

## 2017-12-19 RX ORDER — ROCURONIUM BROMIDE 10 MG/ML
INJECTION, SOLUTION INTRAVENOUS AS NEEDED
Status: DISCONTINUED | OUTPATIENT
Start: 2017-12-19 | End: 2017-12-19 | Stop reason: SURG

## 2017-12-19 RX ORDER — NALOXONE HCL 0.4 MG/ML
0.2 VIAL (ML) INJECTION AS NEEDED
Status: DISCONTINUED | OUTPATIENT
Start: 2017-12-19 | End: 2017-12-19 | Stop reason: HOSPADM

## 2017-12-19 RX ORDER — PANTOPRAZOLE SODIUM 40 MG/1
40 TABLET, DELAYED RELEASE ORAL
Status: DISCONTINUED | OUTPATIENT
Start: 2017-12-20 | End: 2017-12-20

## 2017-12-19 RX ORDER — ATORVASTATIN CALCIUM 10 MG/1
10 TABLET, FILM COATED ORAL DAILY
Status: DISCONTINUED | OUTPATIENT
Start: 2017-12-19 | End: 2017-12-22 | Stop reason: HOSPADM

## 2017-12-19 RX ORDER — SUCCINYLCHOLINE CHLORIDE 20 MG/ML
INJECTION INTRAMUSCULAR; INTRAVENOUS AS NEEDED
Status: DISCONTINUED | OUTPATIENT
Start: 2017-12-19 | End: 2017-12-19 | Stop reason: SURG

## 2017-12-19 RX ORDER — SODIUM CHLORIDE, SODIUM GLUCONATE, SODIUM ACETATE, POTASSIUM CHLORIDE AND MAGNESIUM CHLORIDE 526; 502; 368; 37; 30 MG/100ML; MG/100ML; MG/100ML; MG/100ML; MG/100ML
30 INJECTION, SOLUTION INTRAVENOUS CONTINUOUS
Status: DISCONTINUED | OUTPATIENT
Start: 2017-12-19 | End: 2017-12-19

## 2017-12-19 RX ORDER — LEVOTHYROXINE SODIUM 0.03 MG/1
25 TABLET ORAL DAILY
Status: DISCONTINUED | OUTPATIENT
Start: 2017-12-19 | End: 2017-12-22 | Stop reason: HOSPADM

## 2017-12-19 RX ORDER — MORPHINE SULFATE 8 MG/ML
2 INJECTION INTRAMUSCULAR; INTRAVENOUS; SUBCUTANEOUS
Status: DISCONTINUED | OUTPATIENT
Start: 2017-12-19 | End: 2017-12-22 | Stop reason: HOSPADM

## 2017-12-19 RX ORDER — MIRTAZAPINE 15 MG/1
30 TABLET, FILM COATED ORAL NIGHTLY
Status: DISCONTINUED | OUTPATIENT
Start: 2017-12-19 | End: 2017-12-22 | Stop reason: HOSPADM

## 2017-12-19 RX ORDER — SODIUM CHLORIDE AND POTASSIUM CHLORIDE 150; 900 MG/100ML; MG/100ML
100 INJECTION, SOLUTION INTRAVENOUS CONTINUOUS
Status: DISCONTINUED | OUTPATIENT
Start: 2017-12-19 | End: 2017-12-21

## 2017-12-19 RX ORDER — ALBUTEROL SULFATE 90 UG/1
2 AEROSOL, METERED RESPIRATORY (INHALATION) EVERY 4 HOURS PRN
Status: DISCONTINUED | OUTPATIENT
Start: 2017-12-19 | End: 2017-12-19 | Stop reason: CLARIF

## 2017-12-19 RX ORDER — FENTANYL CITRATE 50 UG/ML
INJECTION, SOLUTION INTRAMUSCULAR; INTRAVENOUS AS NEEDED
Status: DISCONTINUED | OUTPATIENT
Start: 2017-12-19 | End: 2017-12-19 | Stop reason: SURG

## 2017-12-19 RX ORDER — FLUMAZENIL 0.1 MG/ML
0.2 INJECTION INTRAVENOUS AS NEEDED
Status: DISCONTINUED | OUTPATIENT
Start: 2017-12-19 | End: 2017-12-19 | Stop reason: HOSPADM

## 2017-12-19 RX ORDER — MELATONIN
5000 DAILY
Status: DISCONTINUED | OUTPATIENT
Start: 2017-12-19 | End: 2017-12-22 | Stop reason: HOSPADM

## 2017-12-19 RX ORDER — TAMSULOSIN HYDROCHLORIDE 0.4 MG/1
0.4 CAPSULE ORAL NIGHTLY
Status: DISCONTINUED | OUTPATIENT
Start: 2017-12-19 | End: 2017-12-22 | Stop reason: HOSPADM

## 2017-12-19 RX ORDER — LIDOCAINE HYDROCHLORIDE 20 MG/ML
INJECTION, SOLUTION INFILTRATION; PERINEURAL AS NEEDED
Status: DISCONTINUED | OUTPATIENT
Start: 2017-12-19 | End: 2017-12-19 | Stop reason: SURG

## 2017-12-19 RX ORDER — ACETAMINOPHEN 325 MG/1
650 TABLET ORAL ONCE AS NEEDED
Status: DISCONTINUED | OUTPATIENT
Start: 2017-12-19 | End: 2017-12-19 | Stop reason: HOSPADM

## 2017-12-19 RX ORDER — PROMETHAZINE HYDROCHLORIDE 25 MG/ML
12.5 INJECTION, SOLUTION INTRAMUSCULAR; INTRAVENOUS EVERY 6 HOURS PRN
Status: DISCONTINUED | OUTPATIENT
Start: 2017-12-19 | End: 2017-12-22 | Stop reason: HOSPADM

## 2017-12-19 RX ORDER — PROPOFOL 10 MG/ML
VIAL (ML) INTRAVENOUS AS NEEDED
Status: DISCONTINUED | OUTPATIENT
Start: 2017-12-19 | End: 2017-12-19 | Stop reason: SURG

## 2017-12-19 RX ORDER — ALBUTEROL SULFATE 2.5 MG/3ML
2.5 SOLUTION RESPIRATORY (INHALATION) EVERY 4 HOURS PRN
Status: DISCONTINUED | OUTPATIENT
Start: 2017-12-19 | End: 2017-12-22 | Stop reason: HOSPADM

## 2017-12-19 RX ORDER — DOCUSATE SODIUM 100 MG/1
100 CAPSULE, LIQUID FILLED ORAL 2 TIMES DAILY
Status: DISCONTINUED | OUTPATIENT
Start: 2017-12-19 | End: 2017-12-22 | Stop reason: HOSPADM

## 2017-12-19 RX ORDER — CALCIUM CARBONATE 200(500)MG
1 TABLET,CHEWABLE ORAL DAILY PRN
Status: DISCONTINUED | OUTPATIENT
Start: 2017-12-19 | End: 2017-12-22 | Stop reason: HOSPADM

## 2017-12-19 RX ORDER — DEXAMETHASONE SODIUM PHOSPHATE 4 MG/ML
INJECTION, SOLUTION INTRA-ARTICULAR; INTRALESIONAL; INTRAMUSCULAR; INTRAVENOUS; SOFT TISSUE AS NEEDED
Status: DISCONTINUED | OUTPATIENT
Start: 2017-12-19 | End: 2017-12-19 | Stop reason: SURG

## 2017-12-19 RX ORDER — HYDROCODONE BITARTRATE AND ACETAMINOPHEN 7.5; 325 MG/1; MG/1
1 TABLET ORAL EVERY 6 HOURS PRN
Status: DISCONTINUED | OUTPATIENT
Start: 2017-12-19 | End: 2017-12-22 | Stop reason: HOSPADM

## 2017-12-19 RX ORDER — SODIUM CHLORIDE 0.9 % (FLUSH) 0.9 %
SYRINGE (ML) INJECTION
Status: COMPLETED
Start: 2017-12-19 | End: 2017-12-19

## 2017-12-19 RX ORDER — AMIODARONE HYDROCHLORIDE 200 MG/1
200 TABLET ORAL DAILY
Status: DISCONTINUED | OUTPATIENT
Start: 2017-12-19 | End: 2017-12-22 | Stop reason: HOSPADM

## 2017-12-19 RX ORDER — LABETALOL HYDROCHLORIDE 5 MG/ML
5 INJECTION, SOLUTION INTRAVENOUS
Status: DISCONTINUED | OUTPATIENT
Start: 2017-12-19 | End: 2017-12-19 | Stop reason: HOSPADM

## 2017-12-19 RX ORDER — MEPERIDINE HYDROCHLORIDE 50 MG/ML
12.5 INJECTION INTRAMUSCULAR; INTRAVENOUS; SUBCUTANEOUS
Status: DISCONTINUED | OUTPATIENT
Start: 2017-12-19 | End: 2017-12-19 | Stop reason: HOSPADM

## 2017-12-19 RX ORDER — EPHEDRINE SULFATE 50 MG/ML
5 INJECTION, SOLUTION INTRAVENOUS ONCE AS NEEDED
Status: DISCONTINUED | OUTPATIENT
Start: 2017-12-19 | End: 2017-12-19 | Stop reason: HOSPADM

## 2017-12-19 RX ORDER — PANTOPRAZOLE SODIUM 40 MG/1
40 TABLET, DELAYED RELEASE ORAL EVERY MORNING
Status: DISCONTINUED | OUTPATIENT
Start: 2017-12-19 | End: 2017-12-22 | Stop reason: HOSPADM

## 2017-12-19 RX ORDER — DIPHENHYDRAMINE HYDROCHLORIDE 50 MG/ML
12.5 INJECTION INTRAMUSCULAR; INTRAVENOUS
Status: DISCONTINUED | OUTPATIENT
Start: 2017-12-19 | End: 2017-12-19 | Stop reason: HOSPADM

## 2017-12-19 RX ORDER — SODIUM CHLORIDE, SODIUM GLUCONATE, SODIUM ACETATE, POTASSIUM CHLORIDE AND MAGNESIUM CHLORIDE 526; 502; 368; 37; 30 MG/100ML; MG/100ML; MG/100ML; MG/100ML; MG/100ML
1000 INJECTION, SOLUTION INTRAVENOUS CONTINUOUS
Status: DISCONTINUED | OUTPATIENT
Start: 2017-12-19 | End: 2017-12-20

## 2017-12-19 RX ORDER — OLANZAPINE 5 MG/1
5 TABLET ORAL NIGHTLY
Status: DISCONTINUED | OUTPATIENT
Start: 2017-12-19 | End: 2017-12-22 | Stop reason: HOSPADM

## 2017-12-19 RX ORDER — POLYETHYLENE GLYCOL 3350 17 G/17G
17 POWDER, FOR SOLUTION ORAL 2 TIMES DAILY
Status: DISCONTINUED | OUTPATIENT
Start: 2017-12-19 | End: 2017-12-22 | Stop reason: HOSPADM

## 2017-12-19 RX ORDER — SUCCINYLCHOLINE CHLORIDE 20 MG/ML
INJECTION INTRAMUSCULAR; INTRAVENOUS AS NEEDED
Status: DISCONTINUED | OUTPATIENT
Start: 2017-12-19 | End: 2017-12-19

## 2017-12-19 RX ORDER — METOPROLOL SUCCINATE 25 MG
12.5 TABLET, EXTENDED RELEASE 24 HR ORAL DAILY
Status: DISCONTINUED | OUTPATIENT
Start: 2017-12-19 | End: 2017-12-22 | Stop reason: HOSPADM

## 2017-12-19 RX ORDER — ACETAMINOPHEN 650 MG/1
650 SUPPOSITORY RECTAL ONCE AS NEEDED
Status: DISCONTINUED | OUTPATIENT
Start: 2017-12-19 | End: 2017-12-19 | Stop reason: HOSPADM

## 2017-12-19 RX ADMIN — FENTANYL CITRATE 50 MCG: 50 INJECTION, SOLUTION INTRAMUSCULAR; INTRAVENOUS at 17:25

## 2017-12-19 RX ADMIN — POTASSIUM CHLORIDE AND SODIUM CHLORIDE 100 ML/HR: 900; 150 INJECTION, SOLUTION INTRAVENOUS at 21:20

## 2017-12-19 RX ADMIN — DOCUSATE SODIUM 100 MG: 100 CAPSULE, LIQUID FILLED ORAL at 22:57

## 2017-12-19 RX ADMIN — FENTANYL CITRATE 100 MCG: 50 INJECTION, SOLUTION INTRAMUSCULAR; INTRAVENOUS at 17:59

## 2017-12-19 RX ADMIN — FENTANYL CITRATE 50 MCG: 50 INJECTION, SOLUTION INTRAMUSCULAR; INTRAVENOUS at 17:46

## 2017-12-19 RX ADMIN — MORPHINE SULFATE 2 MG: 8 INJECTION, SOLUTION INTRAMUSCULAR; INTRAVENOUS at 08:58

## 2017-12-19 RX ADMIN — SODIUM CHLORIDE, SODIUM GLUCONATE, SODIUM ACETATE, POTASSIUM CHLORIDE AND MAGNESIUM CHLORIDE 1000 ML: 526; 502; 368; 37; 30 INJECTION, SOLUTION INTRAVENOUS at 17:30

## 2017-12-19 RX ADMIN — Medication 12.5 MG: at 13:56

## 2017-12-19 RX ADMIN — SUCCINYLCHOLINE CHLORIDE 120 MG: 20 INJECTION, SOLUTION INTRAMUSCULAR; INTRAVENOUS at 16:59

## 2017-12-19 RX ADMIN — SODIUM CHLORIDE, SODIUM GLUCONATE, SODIUM ACETATE, POTASSIUM CHLORIDE AND MAGNESIUM CHLORIDE 1000 ML: 526; 502; 368; 37; 30 INJECTION, SOLUTION INTRAVENOUS at 15:10

## 2017-12-19 RX ADMIN — TAMSULOSIN HYDROCHLORIDE 0.4 MG: 0.4 CAPSULE ORAL at 22:54

## 2017-12-19 RX ADMIN — MORPHINE SULFATE 2 MG: 8 INJECTION, SOLUTION INTRAMUSCULAR; INTRAVENOUS at 05:22

## 2017-12-19 RX ADMIN — MORPHINE SULFATE 2 MG: 8 INJECTION, SOLUTION INTRAMUSCULAR; INTRAVENOUS at 13:56

## 2017-12-19 RX ADMIN — ACETAMINOPHEN 650 MG: 325 TABLET ORAL at 00:27

## 2017-12-19 RX ADMIN — ROCURONIUM BROMIDE 40 MG: 10 INJECTION INTRAVENOUS at 17:09

## 2017-12-19 RX ADMIN — PROPOFOL 100 MG: 10 INJECTION, EMULSION INTRAVENOUS at 16:59

## 2017-12-19 RX ADMIN — OLANZAPINE 5 MG: 5 TABLET, FILM COATED ORAL at 22:53

## 2017-12-19 RX ADMIN — Medication 10 ML: at 10:07

## 2017-12-19 RX ADMIN — MIRTAZAPINE 30 MG: 15 TABLET, FILM COATED ORAL at 22:54

## 2017-12-19 RX ADMIN — SODIUM CHLORIDE, SODIUM GLUCONATE, SODIUM ACETATE, POTASSIUM CHLORIDE AND MAGNESIUM CHLORIDE 500 ML: 526; 502; 368; 37; 30 INJECTION, SOLUTION INTRAVENOUS at 18:37

## 2017-12-19 RX ADMIN — LIDOCAINE HYDROCHLORIDE 100 MG: 20 INJECTION, SOLUTION INFILTRATION; PERINEURAL at 16:59

## 2017-12-19 RX ADMIN — WATER 2 G: 1 INJECTION INTRAMUSCULAR; INTRAVENOUS; SUBCUTANEOUS at 22:54

## 2017-12-19 RX ADMIN — DEXAMETHASONE SODIUM PHOSPHATE 4 MG: 4 INJECTION, SOLUTION INTRAMUSCULAR; INTRAVENOUS at 18:09

## 2017-12-19 NOTE — CONSULTS
Patient Name:  Nickolas Muhammad  Admit Date:  12/19/2017  Consult Date:  12/19/2017      Referring Provider: Tamera Cash MD   Reason for Consultation: left hip fracture    Patient Care Team:  Ilana Hess MD as PCP - General (Internal Medicine)    Chief complaint: left hip pain    Subjective .     History of present illness:  Was on psych unit and fell last night.  Then began complaining of pain.  Severe pain and unable to bear weight  Report is that he was walking with a wheelchair and it got out in front of him.  No numbness or tingling.    Review of Systems:  The following systems were reviewed and negative;  respiratory, cardiovascular and gastrointestinal  All other systems reviewed as negative    History  Past Medical History:   Diagnosis Date   • Anxiety    • Cancer    • Depression    • Schizoaffective disorder    ,   Past Surgical History:   Procedure Laterality Date   • CARDIAC SURGERY     , History reviewed. No pertinent family history.,   Social History   Substance Use Topics   • Smoking status: Former Smoker   • Smokeless tobacco: Never Used   • Alcohol use None   ,   Prescriptions Prior to Admission   Medication Sig Dispense Refill Last Dose   • albuterol (PROVENTIL HFA;VENTOLIN HFA) 108 (90 Base) MCG/ACT inhaler Inhale 2 puffs Every 6 (Six) Hours As Needed for Wheezing.   12/14/2017 at Unknown time   • amiodarone (PACERONE) 200 MG tablet Take 200 mg by mouth Daily.   12/14/2017 at Unknown time   • calcium carbonate 648 MG tablet tablet Take 648 mg by mouth Daily.   12/14/2017 at Unknown time   • Cholecalciferol (VITAMIN D3) 5000 units capsule capsule Take 5,000 Units by mouth Daily.   12/14/2017 at Unknown time   • glucosamine-chondroitin 500-400 MG capsule capsule Take  by mouth 3 (Three) Times a Day With Meals.   Unknown at Unknown time   • levothyroxine (SYNTHROID, LEVOTHROID) 25 MCG tablet Take 25 mcg by mouth Daily.   12/14/2017 at Unknown time   • metoprolol succinate XL (TOPROL-XL) 25 MG  24 hr tablet Take 12.5 mg by mouth Daily.   12/14/2017 at Unknown time   • mirtazapine (REMERON) 30 MG tablet Take 30 mg by mouth Every Night.   12/14/2017 at Unknown time   • Multiple Vitamins-Minerals (MULTIVITAMIN WITH MINERALS) tablet tablet Take 1 tablet by mouth Daily.   12/14/2017 at Unknown time   • OLANZapine (zyPREXA) 5 MG tablet Take 5 mg by mouth Every Night.   12/14/2017 at Unknown time   • Omega-3 Fatty Acids (FISH OIL) 1000 MG capsule capsule Take  by mouth Daily With Breakfast.   12/14/2017 at Unknown time   • omeprazole (priLOSEC) 20 MG capsule Take 20 mg by mouth 2 (Two) Times a Day.   12/14/2017 at Unknown time   • polyethylene glycol (MIRALAX) packet Take 17 g by mouth 2 (Two) Times a Day.   12/14/2017 at Unknown time   • pravastatin (PRAVACHOL) 20 MG tablet Take 20 mg by mouth Every Night.   12/14/2017 at Unknown time   • senna (SENOKOT) 8.6 MG tablet tablet Take  by mouth Every Night.   12/14/2017 at Unknown time   • sertraline (ZOLOFT) 100 MG tablet Take 100 mg by mouth 2 (Two) Times a Day.   12/14/2017 at Unknown time   • tamsulosin (FLOMAX) 0.4 MG capsule 24 hr capsule Take 1 capsule by mouth Every Night.   12/14/2017 at Unknown time    and Allergies:  Review of patient's allergies indicates no known allergies.    Objective     Vital Signs   Temp:  [97 °F (36.1 °C)-98.9 °F (37.2 °C)] 97 °F (36.1 °C)  Heart Rate:  [78-92] 78  Resp:  [18-20] 18  BP: (124-184)/() 184/87          Physical Exam:    General:  Awake, Alert, and oriented and in no apparent distress    Eyes:  PERRLA, no lid lesions    Mouth:  Oral mucosa moist, no gum lesions    Neck:  Trachea midline, no thyroidomegaly    Heart:  RR, no murmur, will, or rub    Lungs:  Good respiratory effort, clear to ausculation bilaterally    Abdomen:  Soft, nontender, nondistended.  No hepatosplenomegaly.    Skin:  Good skin turgor, no skin lesions    Extremities:  Right LE:  Good distal pulses and sensation, nontender, good ROM, stable,  good muscle tone and strength  Left LE:  Good distal pulses and sensation, tender with any motion, shortened and externally rotated.  Good muscle tone, strength and stability deferred due to know fracture.     Results Review:    Xr Chest 1 View    Result Date: 12/15/2017  Narrative: PORTABLE CHEST HISTORY: COPD. Portable AP upright film of the chest was obtained at 7:23 PM. COMPARISON: None Chronic obstructive pulmonary disease. Linear atelectasis or scar left midlung. No focal infiltrate. Postoperative changes in the sternum and mediastinum. Right-sided pacemaker with leads projected over the right atrium and right ventricle. The heart is not enlarged. The pulmonary vasculature is not increased. No pleural effusion. No pneumothorax. No acute osseous abnormality.     Impression: CONCLUSION: Chronic obstructive pulmonary disease. Linear atelectasis or scar left midlung. No focal infiltrate. Coronary artery bypass. Right-sided pacemaker. [id number] Electronically signed by:  Bj Armijo MD  12/15/2017 8:10 PM CST Workstation: Dr. [email or web address]    Xr Hip With Or Without Pelvis 1 View Left    Result Date: 12/19/2017  Narrative: Pelvis and left hip total three view on 12/18/2017 CLINICAL INDICATION: Left hip pain after fall COMPARISON: None FINDINGS: There is an acute, oblique, displaced left subcapital femoral neck fracture. There is lateral and proximal displacement and varus angulation of the distal fracture fragment. The hips are well located. No other fracture is noted. The SI joints are well aligned.     Impression: Acute left subcapital femoral neck fracture. Electronically signed by:  Arnold Abbott  12/19/2017 12:32 AM CST Workstation: [id number]        Lab Results (last 24 hours)     Procedure Component Value Units Date/Time    Comprehensive Metabolic Panel [[id number]] Collected:  12/19/17 0707    Specimen:  Blood Updated:  12/19/17 0714    Protime-INR [[id number]] Collected:  12/19/17 0707    Specimen:  Blood  Updated:  12/19/17 0714    CBC (No Diff) [[id number]]  (Abnormal) Collected:  12/19/17 0707    Specimen:  Blood Updated:  12/19/17 0725     WBC 9.22 10*3/mm3      RBC 3.34 (L) 10*6/mm3      Hemoglobin 10.5 (L) g/dL      Hematocrit 31.5 (L) %      MCV 94.3 fL      MCH 31.4 pg      MCHC 33.3 g/dL      RDW 13.7 %      RDW-SD 47.0 (H) fl      MPV 11.0 fL      Platelets 164 10*3/mm3            I reviewed the patient's new clinical results.  I reviewed the patient's new imaging results and agree with the interpretation.      Assessment/Plan     Principal Problem:    Displaced fracture of base of neck of left femur, initial encounter for closed fracture  Active Problems:    Paroxysmal atrial fibrillation    Essential hypertension    History of subdural hematoma    H/O unilateral nephrectomy      The patient voiced understanding of the risks, benefits, and alternative forms of treatment that were discussed and the patient consents to proceed with surgery.  All risks, benefits and alternatives were discussed.  Risks including to but not exclusive to anesthetic complications, including death, MI, CVA, infection, bleeding DVT, fracture, residual pain and need for future surgery.  This discussion was held with the patient by Ananda Edwards MD and all questions were answered.    Discussed bipolar endoprosthesis left hip.  Also discussed surgery being performed by Dr Talley - patient is agreeable with that.  Discussed mobilization postop.  Remote history of subdural hematoma.  Presence of nephrectomy, HTN, AFIB, and depression make decision making and clinical management more difficult.    Discussed with Dr Talley.    I discussed the patients findings and my recommendations with patient, nursing staff and consulting provider    Ananda Edwards MD  12/19/17  7:43 AM

## 2017-12-19 NOTE — NURSING NOTE
"Patient was found laying in hallway by room 682. Fall was unwitnessed by staff.  Another patient was standing beside him.  Patient stated \"I fell out of the wheelchair\" then states \"I was pushing the wheelchair and my legs flew out from under me\".  Reports that he hit the back of his head.  Patient was assisted back up to chair x3 staff. Once in chair patient c/o hip pain. Vitals taken. Hospitalist on call was notified of fall, hip pain, and pt reports of hitting head (Dr. Cash).  PERRLA, small red area on back of head, no swelling, bruising, or knots.  Xray of left hip ordered. SAFE report completed. Will continue to closely monitor.    "

## 2017-12-19 NOTE — NURSING NOTE
Behavior    Anxiety: no anxiety noted  Depression: No anxiety noted  Pain   0  AVH   no  S/I   no  H/I   no  Affect   calm and pleasant    Patient out in [place] day [place] in wheelchair.   Has been interacting with peers and staff.  Calm and pleasant.  Med compliant.  Orientated to self only.  Asked several times where he was at. Patient has been walking around unit.  No needs voiced.  Will continue to monitor.      Intervention  Medications reviewed and administered  Assessment complete    Response  Verbalized understanding   Took medications  Interacted with assessment    Plan  Will promote and reinforce current treatment plan and encourage involvement in care plan goals.   Will provide for safe, calm, quiet environment.  Will promote open communication with staff and foster a trusting/working relationship with patient.   Will promote participation in groups and therapies and independent reflection.

## 2017-12-19 NOTE — NURSING NOTE
No return phone call from brother at this time. Multiple notes in chart states pt's brother, Cameron, is POA. No legal form present at this time to verify. [facility] to [facility], does have no record of Cameron being the pt's POA. Nursing home states patient signs all of his own consents and forms at the nursing home. Risk management consulted per RN. H&P on admission states pt is alert and oriented x4. Currently, patient continues to be A&O x4. Marnie Cash, Risk Management, states it is okay for patient to sign own consent given current mental status.

## 2017-12-19 NOTE — ANESTHESIA PREPROCEDURE EVALUATION
Anesthesia Evaluation     Patient summary reviewed and Nursing notes reviewed   NPO Solid Status: > 8 hours  NPO Liquid Status: > 8 hours     Airway   Mallampati: II  TM distance: >3 FB  Neck ROM: full  possible difficult intubation  Dental    (+) poor dentation    Comment: Mostly edentulous with remaining dentition in very poor repair.    Pulmonary - normal exam    breath sounds clear to auscultation  (+) a smoker Former, COPD moderate, decreased breath sounds,   Cardiovascular - normal exam    ECG reviewed  Patient on routine beta blocker and Beta blocker given within 24 hours of surgery  Rhythm: regular  Rate: normal    (+) pacemaker interrogated unknown, hypertension, dysrhythmias Atrial Fib, murmur (Grade II/VI systolic murmur.), hyperlipidemia    ROS comment: EKG:Atrial pacemaker.    Neuro/Psych  (+) psychiatric history (According to patient he signs his own consent. Discussed with patient's nurse who will get proper consent. ) Depression,    Dementia: History of possible dementia. Patient oriented to person,place and time.  GI/Hepatic/Renal/Endo    (+)  GERD well controlled, renal disease (Previous nephrectomy;creatinine 1.14), hypothyroidism,     ROS Comment: HGB 10.5 HCT 31.5    Musculoskeletal (-) negative ROS    Abdominal    Substance History - negative use     OB/GYN negative ob/gyn ROS         Other - negative ROS                                     Anesthesia Plan    ASA 4 - emergent     general     intravenous induction   Anesthetic plan and risks discussed with patient.

## 2017-12-19 NOTE — H&P
[facility] Medicine Admission      Date of Admission: 12/19/2017      Primary Care Physician: Ilana Hess MD      Chief Complaint:  Left hip pain     HPI:This is a 72 y old male who was hospitalized in  for depression and aggressive behavior .  Today patient fell , then started complaining of left hip pain then nurse called the night doctor who ordered an xray and there was an acute subcapital femoral neck fracture  On the left       Past Medical History:  has a past medical history of Anxiety; Cancer; Depression; and Schizoaffective disorder.    Past Surgical History:  has a past surgical history that includes Cardiac surgery.    Family History:negative for DM or CAD   Social History:  reports that he has quit smoking. He has never used smokeless tobacco.    Allergies: No Known Allergies    Medications:   Prescriptions Prior to Admission   Medication Sig Dispense Refill Last Dose   • albuterol (PROVENTIL HFA;VENTOLIN HFA) 108 (90 Base) MCG/ACT inhaler Inhale 2 puffs Every 6 (Six) Hours As Needed for Wheezing.   12/14/2017 at Unknown time   • amiodarone (PACERONE) 200 MG tablet Take 200 mg by mouth Daily.   12/14/2017 at Unknown time   • calcium carbonate 648 MG tablet tablet Take 648 mg by mouth Daily.   12/14/2017 at Unknown time   • Cholecalciferol (VITAMIN D3) 5000 units capsule capsule Take 5,000 Units by mouth Daily.   12/14/2017 at Unknown time   • glucosamine-chondroitin 500-400 MG capsule capsule Take  by mouth 3 (Three) Times a Day With Meals.   Unknown at Unknown time   • levothyroxine (SYNTHROID, LEVOTHROID) 25 MCG tablet Take 25 mcg by mouth Daily.   12/14/2017 at Unknown time   • metoprolol succinate XL (TOPROL-XL) 25 MG 24 hr tablet Take 12.5 mg by mouth Daily.   12/14/2017 at Unknown time   • mirtazapine (REMERON) 30 MG tablet Take 30 mg by mouth Every Night.   12/14/2017 at Unknown time   • Multiple Vitamins-Minerals (MULTIVITAMIN WITH MINERALS)  tablet tablet Take 1 tablet by mouth Daily.   12/14/2017 at Unknown time   • OLANZapine (zyPREXA) 5 MG tablet Take 5 mg by mouth Every Night.   12/14/2017 at Unknown time   • Omega-3 Fatty Acids (FISH OIL) 1000 MG capsule capsule Take  by mouth Daily With Breakfast.   12/14/2017 at Unknown time   • omeprazole (priLOSEC) 20 MG capsule Take 20 mg by mouth 2 (Two) Times a Day.   12/14/2017 at Unknown time   • polyethylene glycol (MIRALAX) packet Take 17 g by mouth 2 (Two) Times a Day.   12/14/2017 at Unknown time   • pravastatin (PRAVACHOL) 20 MG tablet Take 20 mg by mouth Every Night.   12/14/2017 at Unknown time   • senna (SENOKOT) 8.6 MG tablet tablet Take  by mouth Every Night.   12/14/2017 at Unknown time   • sertraline (ZOLOFT) 100 MG tablet Take 100 mg by mouth 2 (Two) Times a Day.   12/14/2017 at Unknown time   • tamsulosin (FLOMAX) 0.4 MG capsule 24 hr capsule Take 1 capsule by mouth Every Night.   12/14/2017 at Unknown time       Review of Systems:  Review of Systems   Constitutional: Negative for activity change, appetite change, chills, diaphoresis, fatigue, fever and unexpected weight change.   HENT: Negative.  Negative for congestion, dental problem, drooling, ear discharge, ear pain, facial swelling, hearing loss, mouth sores, nosebleeds, postnasal drip, rhinorrhea, sinus pressure, sneezing, tinnitus, trouble swallowing and voice change.    Eyes: Negative for photophobia and discharge.   Respiratory: Negative for apnea, cough, choking, shortness of breath, wheezing and stridor.    Cardiovascular: Negative for chest pain, palpitations and leg swelling.   Gastrointestinal: Negative for abdominal pain, anal bleeding, blood in stool, constipation, diarrhea, nausea, rectal pain and vomiting.   Endocrine: Negative for cold intolerance, heat intolerance, polydipsia, polyphagia and polyuria.   Genitourinary: Negative for dysuria, enuresis, frequency, hematuria and urgency.   Musculoskeletal: Positive for  arthralgias. Negative for back pain, joint swelling, myalgias, neck pain and neck stiffness.   Skin: Negative for color change, pallor, rash and wound.   Allergic/Immunologic: Negative for food allergies and immunocompromised state.   Neurological: Negative for dizziness, tremors, seizures, syncope, facial asymmetry, speech difficulty, weakness, light-headedness, numbness and headaches.   Hematological: Negative for adenopathy.   Psychiatric/Behavioral: Negative for agitation, behavioral problems, confusion, hallucinations, sleep disturbance and suicidal ideas. The patient is not nervous/anxious.       Otherwise complete ROS is negative except as mentioned above.    Physical Exam:   Temp:  [97.3 °F (36.3 °C)-98.9 °F (37.2 °C)] 98.9 °F (37.2 °C)  Heart Rate:  [81-92] 81  Resp:  [18-20] 18  BP: (124-182)/() 182/112  Physical Exam   Constitutional: He is oriented to person, place, and time. He appears well-developed and well-nourished.   HENT:   Head: Normocephalic and atraumatic.   Eyes: EOM are normal. Pupils are equal, round, and reactive to light.   Neck: Normal range of motion. Neck supple.   Cardiovascular: Normal rate, regular rhythm, normal heart sounds and intact distal pulses.  Exam reveals no gallop and no friction rub.    No murmur heard.  Pulmonary/Chest: Effort normal and breath sounds normal.   Abdominal: Soft. Bowel sounds are normal. He exhibits no distension and no mass. There is no tenderness. There is no guarding.   Musculoskeletal: Normal range of motion.   Neurological: He is alert and oriented to person, place, and time.   Skin: Skin is warm and dry.   Psychiatric: He has a normal mood and affect. His behavior is normal. Judgment and thought content normal.         Results Reviewed:  I have personally reviewed current lab, radiology, and data and agree with results.  Lab Results (last 24 hours)     ** No results found for the last 24 hours. **        Imaging Results (last 24 hours)     **  No results found for the last 24 hours. **            Assessment:    Hospital Problem List     Closed left hip fracture                Plan:admit to med surg   Iv pain medication   Orthopedic consult  Npo after midnight (in case of surgery in am )  Labs in am   Continue home medication as medical course dictates   No blood thinners as he might have surgery and he has a hx of subdural hematoma     I discussed the patients findings and my recommendations with: patient     Tamera Cash MD  12/19/17  4:02 AM

## 2017-12-19 NOTE — NURSING NOTE
History of Present Illness:    Sarah Gurrola is a 55 year old female who recently met Dr. Ramesh. In the middle of June she noted  more frequent irregular heartbeats with a sense of palpitations and flip-flopping in her chest associated with fatigue. EKG was performed at primary care identifying premature ventricular contractions which are unifocal. TSH, magnesium, hemoglobin, and electrolyte panels were drawn and were all normal. Twelve-lead EKG showed no evidence of pre-excitation there was poor R-wave progression across the anteroseptal leads.    This patient has a family history of atrial fibrillation and apparently a niece who has what sounds like an accessory pathway that may need to be ablated at some point. There is no history of sudden death in the family.     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 She works relatively long days. She has 2 cups of coffee in the morning which are half-caf and has recently been identified to have some reactive airway disease. She has been tried on antihistamines particularly Xyzal, which gave her increased palpitations. She is now on Zyrtec which she tolerates better.    Based on this history and findings she was sent for a stress echocardiogram and echocardiogram and is here today to review those results. She also wore event recorder.    Her echocardiogram shows preserved left ventricular and right ventricular function. She has mild mitral regurgitation mild tricusp insufficiency no pulmonary hypertension; aortic valve has some sclerosis with no stenosis, 1+ pulmonic valve regurgitation is present.    Stress echocardiogram showed no evidence of ischemia. She exercised for 8 1/2 minutes on a Merrick protocol with no chest pain. There were no significant arrhythmias induced during exercise. She had no excessive blood pressure response to exercise.    Event monitor noted PVCs--every time she marked it as a sense of a skipped beat. She states over  Radiology on unit obtaining portable xray   the last couple of weeks he seemed to have improved somewhat.    She admits to more stress recently which certainly can be triggers for premature ventricular beats.    I have reviewed all of her test results in detail. We discussed possible pharmacologic therapy for these. However, with reactive airway disease I would not entertain the idea of beta blockers unless they were highly cardioselective such as Bystolic. Because of the improvement in her symptoms lately she wishes to observe.        Impression/Plan:   1. Mild valvular heart disease with 1+ tricuspid, pulmonic and mitral insufficiency and aortic sclerosis. I will message Dr. Ramesh to obtain his opinon about timing of any repeat echocardiogram.  2. No evidence of coronary artery disease preserved LV function 3  3. Premature ventricular contractions, likely the cause of her palpitations. In the setting of normal labs and minimal if any structural cardiac abnormalties we would likely suggest conservative management.  4. Reactive airway disease. She did have some increase in ectopic beats Xyzal--this has been changed to Zyrtec.    I will review the plan with Dr. Ramesh in terms her follow-up. I will message her call her with this information after it had an opportunity to discuss the case with him  It has been a pleasure seeing Jewell ADY Gurrola today. We will see her back in       George Aguillon, MSN, APRN-BC, CNP  Cardiology    No orders of the defined types were placed in this encounter.    No orders of the defined types were placed in this encounter.    There are no discontinued medications.      Encounter Diagnoses   Name Primary?     PVC's (premature ventricular contractions)      Palpitations        CURRENT MEDICATIONS:  Current Outpatient Prescriptions   Medication Sig Dispense Refill     pantoprazole sodium 40 MG tablet Take 40 mg by mouth daily       cetirizine HCl (ZYRTEC ALLERGY) 10 MG CAPS Take by mouth daily       Vitamin D,  Cholecalciferol, 1000 UNITS CAPS Take by mouth daily       guaiFENesin (MUCINEX) 600 MG 12 hr tablet Take 1,200 mg by mouth daily       Nutritional Supplements (BIFIDO-GENIC GROWTH FACTORS PO)        ranitidine (ZANTAC) 150 MG capsule Take 150 mg by mouth every evening       Misc Natural Products (OSTEO BI-FLEX ADV TRIPLE ST PO) Take by mouth daily       montelukast (SINGULAIR) 10 MG tablet Take 10 mg by mouth At Bedtime       albuterol (VENTOLIN HFA) 108 (90 BASE) MCG/ACT Inhaler Inhale 2 puffs into the lungs as needed for shortness of breath / dyspnea or wheezing       FLUoxetine HCl (PROZAC PO) Take 20 mg by mouth daily        Levothyroxine Sodium (SYNTHROID PO) Take 75 mcg by mouth       fluticasone (VERAMYST) 27.5 MCG/SPRAY nasal spray Spray 2 sprays into both nostrils 2 times daily        albuterol (PROVENTIL) (5 MG/ML) 0.5% nebulizer solution Take 2.5 mg by nebulization every 6 hours as needed for wheezing or shortness of breath / dyspnea       Docusate Sodium (COLACE PO) Take 100 mg by mouth daily        Naproxen Sodium (ALEVE PO) Take 220 mg by mouth daily        HYDRALAZINE HCL PO Take 50 mg by mouth         ALLERGIES     Allergies   Allergen Reactions     Elavil [Amitriptyline]      Lexapro [Escitalopram] Unknown       PAST MEDICAL HISTORY:  Past Medical History:   Diagnosis Date     Gastro-oesophageal reflux disease      Palpitations      PVC (premature ventricular contraction)      Thyroid disease      Uncomplicated asthma        PAST SURGICAL HISTORY:  Past Surgical History:   Procedure Laterality Date      SECTION       Repeat low segment transverse  section and tubal ligation  2004       FAMILY HISTORY:  No family history on file.    SOCIAL HISTORY:  Social History     Social History     Marital status:      Spouse name: N/A     Number of children: N/A     Years of education: N/A     Social History Main Topics     Smoking status: Never Smoker     Smokeless tobacco: None      "Alcohol use Yes      Comment: occ     Drug use: No     Sexual activity: Yes     Partners: Male     Other Topics Concern     None     Social History Narrative       Review of Systems:  Skin:  Negative     Eyes:  Positive for glasses;contacts  ENT:  Positive for nasal congestion  Respiratory:  Positive for shortness of breath  Cardiovascular:  Negative    Gastroenterology: Positive for heartburn  Genitourinary:  Positive for dysuria  Musculoskeletal:  Negative    Neurologic:  Negative    Psychiatric:  Negative    Heme/Lymph/Imm:  Positive for allergies  Endocrine:  Positive for thyroid disorder    Physical Exam:  Vitals: /80 (BP Location: Right arm, Patient Position: Sitting, Cuff Size: Adult Regular)  Pulse 80  Ht 1.626 m (5' 4\")  Wt 71.9 kg (158 lb 9.6 oz)  Breastfeeding? No  BMI 27.22 kg/m2    Constitutional:           Skin:           Head:           Eyes:           ENT:           Neck:           Chest:           Cardiac:                    Abdomen:           Vascular:                                        Extremities and Back:           Neurological:             Recent Lab Results:  LIPID RESULTS:  No results found for: CHOL, HDL, LDL, TRIG, CHOLHDLRATIO    LIVER ENZYME RESULTS:  Lab Results   Component Value Date    AST 18 06/21/2017    ALT 22 06/21/2017       CBC RESULTS:  Lab Results   Component Value Date    WBC 7.9 06/21/2017    RBC 4.31 06/21/2017    HGB 12.2 06/21/2017    HCT 37.3 06/21/2017    MCV 86.5 06/21/2017    MCH 28.3 06/21/2017    MCHC 32.7 06/21/2017    RDW 14.7 06/21/2017     06/21/2017       BMP RESULTS:  Lab Results   Component Value Date     06/21/2017    POTASSIUM 4.6 06/21/2017    CHLORIDE 106 06/21/2017    CO2 31 07/10/2016    ANIONGAP 6 07/10/2016    GLC 93 06/21/2017    BUN 17 06/21/2017    CR 0.93 06/21/2017    GFRESTIMATED 77 07/10/2016    GFRESTBLACK >90   GFR Calc   07/10/2016    ROSALINE 9.0 06/21/2017        A1C RESULTS:  No results found for: " A1C    INR RESULTS:  No results found for: INR        CC  Alonso Ramesh MD  [id number]  [place] [id number]

## 2017-12-19 NOTE — SIGNIFICANT NOTE
12/18/17 1300   Individual Counseling   Time Session Began 1300   Time Session Ended 1325   Total Time (minutes) 25   Topic Initial psychosocial assesment    Session Detail CSW met with pt 1:1   Patient Response PT DESCRIPTION CSW met pt. 1:1 and attempted to complete psychosocial assessment.. Mood is calm, affect is bright, pt. seems to be pleasant. Pt. is oriented, pt. stated that he is at [facility] and date is December 18th .Speech is soft. Pt. repeatedly said that that he has broken his hip and he wants to go for surgery. Further pt. stated that he would like to meet the CSW the next day. CSW contacted the wife but did not hear back from her. CSW again called pt.'s wife next morning again to get further information. Wife stated that pt. has got worst in last past two, wife further said that pt. needs to go for surgery, no significant substance abuse or trauma reported by wife. Wife stated that they don't have any children and pt. has got 8 siblings; don't know how many sisters and brothers.  Pt. used to help his father on the [address] - as reported by wife. Wife did not mention anything about  but CSW has also referred to physician not that indicate that pt. has served  CSW to follow up accordingly. BPRS was completed upon admission

## 2017-12-19 NOTE — H&P (VIEW-ONLY)
Patient Name:  Nickolas Muhammad  Admit Date:  12/19/2017  Consult Date:  12/19/2017      Referring Provider: Tamera Cash MD   Reason for Consultation: left hip fracture    Patient Care Team:  Ilana Hess MD as PCP - General (Internal Medicine)    Chief complaint: left hip pain    Subjective .     History of present illness:  Was on psych unit and fell last night.  Then began complaining of pain.  Severe pain and unable to bear weight  Report is that he was walking with a wheelchair and it got out in front of him.  No numbness or tingling.    Review of Systems:  The following systems were reviewed and negative;  respiratory, cardiovascular and gastrointestinal  All other systems reviewed as negative    History  Past Medical History:   Diagnosis Date   • Anxiety    • Cancer    • Depression    • Schizoaffective disorder    ,   Past Surgical History:   Procedure Laterality Date   • CARDIAC SURGERY     , History reviewed. No pertinent family history.,   Social History   Substance Use Topics   • Smoking status: Former Smoker   • Smokeless tobacco: Never Used   • Alcohol use None   ,   Prescriptions Prior to Admission   Medication Sig Dispense Refill Last Dose   • albuterol (PROVENTIL HFA;VENTOLIN HFA) 108 (90 Base) MCG/ACT inhaler Inhale 2 puffs Every 6 (Six) Hours As Needed for Wheezing.   12/14/2017 at Unknown time   • amiodarone (PACERONE) 200 MG tablet Take 200 mg by mouth Daily.   12/14/2017 at Unknown time   • calcium carbonate 648 MG tablet tablet Take 648 mg by mouth Daily.   12/14/2017 at Unknown time   • Cholecalciferol (VITAMIN D3) 5000 units capsule capsule Take 5,000 Units by mouth Daily.   12/14/2017 at Unknown time   • glucosamine-chondroitin 500-400 MG capsule capsule Take  by mouth 3 (Three) Times a Day With Meals.   Unknown at Unknown time   • levothyroxine (SYNTHROID, LEVOTHROID) 25 MCG tablet Take 25 mcg by mouth Daily.   12/14/2017 at Unknown time   • metoprolol succinate XL (TOPROL-XL) 25 MG  24 hr tablet Take 12.5 mg by mouth Daily.   12/14/2017 at Unknown time   • mirtazapine (REMERON) 30 MG tablet Take 30 mg by mouth Every Night.   12/14/2017 at Unknown time   • Multiple Vitamins-Minerals (MULTIVITAMIN WITH MINERALS) tablet tablet Take 1 tablet by mouth Daily.   12/14/2017 at Unknown time   • OLANZapine (zyPREXA) 5 MG tablet Take 5 mg by mouth Every Night.   12/14/2017 at Unknown time   • Omega-3 Fatty Acids (FISH OIL) 1000 MG capsule capsule Take  by mouth Daily With Breakfast.   12/14/2017 at Unknown time   • omeprazole (priLOSEC) 20 MG capsule Take 20 mg by mouth 2 (Two) Times a Day.   12/14/2017 at Unknown time   • polyethylene glycol (MIRALAX) packet Take 17 g by mouth 2 (Two) Times a Day.   12/14/2017 at Unknown time   • pravastatin (PRAVACHOL) 20 MG tablet Take 20 mg by mouth Every Night.   12/14/2017 at Unknown time   • senna (SENOKOT) 8.6 MG tablet tablet Take  by mouth Every Night.   12/14/2017 at Unknown time   • sertraline (ZOLOFT) 100 MG tablet Take 100 mg by mouth 2 (Two) Times a Day.   12/14/2017 at Unknown time   • tamsulosin (FLOMAX) 0.4 MG capsule 24 hr capsule Take 1 capsule by mouth Every Night.   12/14/2017 at Unknown time    and Allergies:  Review of patient's allergies indicates no known allergies.    Objective     Vital Signs   Temp:  [97 °F (36.1 °C)-98.9 °F (37.2 °C)] 97 °F (36.1 °C)  Heart Rate:  [78-92] 78  Resp:  [18-20] 18  BP: (124-184)/() 184/87          Physical Exam:    General:  Awake, Alert, and oriented and in no apparent distress    Eyes:  PERRLA, no lid lesions    Mouth:  Oral mucosa moist, no gum lesions    Neck:  Trachea midline, no thyroidomegaly    Heart:  RR, no murmur, will, or rub    Lungs:  Good respiratory effort, clear to ausculation bilaterally    Abdomen:  Soft, nontender, nondistended.  No hepatosplenomegaly.    Skin:  Good skin turgor, no skin lesions    Extremities:  Right LE:  Good distal pulses and sensation, nontender, good ROM, stable,  good muscle tone and strength  Left LE:  Good distal pulses and sensation, tender with any motion, shortened and externally rotated.  Good muscle tone, strength and stability deferred due to know fracture.     Results Review:    Xr Chest 1 View    Result Date: 12/15/2017  Narrative: PORTABLE CHEST HISTORY: COPD. Portable AP upright film of the chest was obtained at 7:23 PM. COMPARISON: None Chronic obstructive pulmonary disease. Linear atelectasis or scar left midlung. No focal infiltrate. Postoperative changes in the sternum and mediastinum. Right-sided pacemaker with leads projected over the right atrium and right ventricle. The heart is not enlarged. The pulmonary vasculature is not increased. No pleural effusion. No pneumothorax. No acute osseous abnormality.     Impression: CONCLUSION: Chronic obstructive pulmonary disease. Linear atelectasis or scar left midlung. No focal infiltrate. Coronary artery bypass. Right-sided pacemaker. [id number] Electronically signed by:  Bj Armijo MD  12/15/2017 8:10 PM CST Workstation: [id number]    Xr Hip With Or Without Pelvis 1 View Left    Result Date: 12/19/2017  Narrative: Pelvis and left hip total three view on 12/18/2017 CLINICAL INDICATION: Left hip pain after fall COMPARISON: None FINDINGS: There is an acute, oblique, displaced left subcapital femoral neck fracture. There is lateral and proximal displacement and varus angulation of the distal fracture fragment. The hips are well located. No other fracture is noted. The SI joints are well aligned.     Impression: Acute left subcapital femoral neck fracture. Electronically signed by:  Arnold Abbott  12/19/2017 12:32 AM CST Workstation: [id number]        Lab Results (last 24 hours)     Procedure Component Value Units Date/Time    Comprehensive Metabolic Panel [[id number]] Collected:  12/19/17 0707    Specimen:  Blood Updated:  12/19/17 0714    Protime-INR [[id number]] Collected:  12/19/17 0707    Specimen:  Blood  Updated:  12/19/17 0714    CBC (No Diff) [[id number]]  (Abnormal) Collected:  12/19/17 0707    Specimen:  Blood Updated:  12/19/17 0725     WBC 9.22 10*3/mm3      RBC 3.34 (L) 10*6/mm3      Hemoglobin 10.5 (L) g/dL      Hematocrit 31.5 (L) %      MCV 94.3 fL      MCH 31.4 pg      MCHC 33.3 g/dL      RDW 13.7 %      RDW-SD 47.0 (H) fl      MPV 11.0 fL      Platelets 164 10*3/mm3            I reviewed the patient's new clinical results.  I reviewed the patient's new imaging results and agree with the interpretation.      Assessment/Plan     Principal Problem:    Displaced fracture of base of neck of left femur, initial encounter for closed fracture  Active Problems:    Paroxysmal atrial fibrillation    Essential hypertension    History of subdural hematoma    H/O unilateral nephrectomy      The patient voiced understanding of the risks, benefits, and alternative forms of treatment that were discussed and the patient consents to proceed with surgery.  All risks, benefits and alternatives were discussed.  Risks including to but not exclusive to anesthetic complications, including death, MI, CVA, infection, bleeding DVT, fracture, residual pain and need for future surgery.  This discussion was held with the patient by Ananda Edwards MD and all questions were answered.    Discussed bipolar endoprosthesis left hip.  Also discussed surgery being performed by Dr Talley - patient is agreeable with that.  Discussed mobilization postop.  Remote history of subdural hematoma.  Presence of nephrectomy, HTN, AFIB, and depression make decision making and clinical management more difficult.    Discussed with Dr Talley.    I discussed the patients findings and my recommendations with patient, nursing staff and consulting provider    Ananda Edwards MD  12/19/17  7:43 AM

## 2017-12-19 NOTE — ANESTHESIA PROCEDURE NOTES
Airway  Urgency: elective    Airway not difficult    General Information and Staff    Patient location during procedure: OR    Indications and Patient Condition  Indications for airway management: airway protection    Preoxygenated: yes  MILS maintained throughout  Mask difficulty assessment: 0 - not attempted    Final Airway Details  Final airway type: endotracheal airway      Successful airway: ETT  Cuffed: yes   Successful intubation technique: direct laryngoscopy  Endotracheal tube insertion site: oral  Blade: Rashid  Blade size: #3  ETT size: 7.5 mm  Cormack-Lehane Classification: grade I - full view of glottis  Placement verified by: chest auscultation   Measured from: gums  ETT to gums (cm): 20  Number of attempts at approach: 1

## 2017-12-19 NOTE — PROGRESS NOTES
[facility] Medicine Services  INPATIENT PROGRESS NOTE     LOS: 0 days   Patient Care Team:  Ilana Hess MD as PCP - General (Internal Medicine)    Chief Complaint:  Displaced fracture of base of neck of left femur, initial encounter for closed fracture      Subjective     Interval History:     Patient Complaints: Patient seen and examined.  Patient resting comfortably.  Patient continues to complain of having hip pain.    History taken from: Patient.    Review of Systems:    Review of Systems   Constitutional: Positive for activity change. Negative for appetite change, chills, diaphoresis and fever.   HENT: Negative for congestion, rhinorrhea, sore throat and trouble swallowing.    Eyes: Negative for visual disturbance.   Respiratory: Negative for cough, chest tightness, shortness of breath and wheezing.    Cardiovascular: Negative for chest pain, palpitations and leg swelling.   Gastrointestinal: Negative for abdominal pain, blood in stool, diarrhea, nausea and vomiting.   Musculoskeletal: Positive for arthralgias, joint swelling and myalgias. Negative for back pain, gait problem and neck pain.   Skin: Negative for rash.   Neurological: Positive for weakness. Negative for dizziness, syncope, light-headedness, numbness and headaches.   Psychiatric/Behavioral: The patient is not nervous/anxious.          Objective     Vital Signs  Temp:  [96.9 °F (36.1 °C)-99.7 °F (37.6 °C)] 99.7 °F (37.6 °C)  Heart Rate:  [71-92] 86  Resp:  [14-20] 14  BP: (141-188)/() 154/84    Physical Exam:   Physical Exam   Constitutional: He is oriented to person, place, and time. He appears well-developed and well-nourished.   HENT:   Head: Normocephalic and atraumatic.   Nose: Nose normal.   Eyes: Conjunctivae and EOM are normal. Pupils are equal, round, and reactive to light.   Neck: Normal range of motion. Neck supple. No JVD present. No tracheal deviation present. No thyromegaly  present.   Cardiovascular: Normal rate, regular rhythm, normal heart sounds and intact distal pulses.    Pulmonary/Chest: Effort normal and breath sounds normal. No respiratory distress. He has no wheezes. He has no rales. He exhibits no tenderness.   Abdominal: Soft. Bowel sounds are normal. He exhibits no distension. There is no tenderness. There is no rebound and no guarding.   Musculoskeletal: Normal range of motion. He exhibits no edema.   Lymphadenopathy:     He has no cervical adenopathy.   Neurological: He is alert and oriented to person, place, and time. He has normal reflexes. No cranial nerve deficit.   Skin: Skin is warm and dry.   Intact   Nursing note and vitals reviewed.         Results Review:       Results from last 7 days  Lab Units 12/19/17  0707 12/15/17  1927   SODIUM mmol/L 141 139   POTASSIUM mmol/L 4.1 4.0   CHLORIDE mmol/L 103 103   CO2 mmol/L 25.0 25.0   BUN mg/dL 20 13   CREATININE mg/dL 1.14 1.08   GLUCOSE mg/dL 92 85   CALCIUM mg/dL 8.8 9.1   BILIRUBIN mg/dL 0.6 0.7   ALK PHOS U/L 77 76   ALT (SGPT) U/L 31 39   AST (SGOT) U/L 49 46               Results from last 7 days  Lab Units 12/19/17  0707 12/15/17  1928   WBC 10*3/mm3 9.22 8.83   HEMOGLOBIN g/dL 10.5* 10.9*   HEMATOCRIT % 31.5* 33.2*   PLATELETS 10*3/mm3 164 176       No results found for: CKTOTAL, CKMB, CKMBINDEX, TROPONINI, TROPONINT    CO2   Date Value Ref Range Status   12/19/2017 25.0 22.0 - 31.0 mmol/L Final         Results from last 7 days  Lab Units 12/19/17  0707   INR  1.09        Imaging Results (last 7 days)     ** No results found for the last 168 hours. **                                    Medication Review:   Current Facility-Administered Medications   Medication Dose Route Frequency Provider Last Rate Last Dose   • [MAR Hold] albuterol (PROVENTIL) nebulizer solution 0.083% 2.5 mg/3mL  2.5 mg Nebulization Q4H PRN Tamera Cash MD       • amiodarone (PACERONE) tablet 200 mg  200 mg Oral Daily Tamera Cash MD        • [MAR Hold] atorvastatin (LIPITOR) tablet 10 mg  10 mg Oral Daily Tamera Cash MD       • [MAR Hold] calcium carbonate (TUMS) chewable tablet 500 mg (200 mg elemental)  1 tablet Oral Daily PRN Tamera Cash MD       • [MAR Hold] ceFAZolin (ANCEF) in SWFI 2 g/20ml IV PUSH syringe  2 g Intravenous Once Javan Talley MD       • [MAR Hold] cholecalciferol (VITAMIN D3) tablet 5,000 Units  5,000 Units Oral Daily Tamera Cash MD       • electrolyte-A (PLASMALYTE-A) solution 1,000 mL  1,000 mL Intravenous Continuous Rory Faulkner MD   1,000 mL at 12/19/17 1510   • [MAR Hold] levothyroxine (SYNTHROID, LEVOTHROID) tablet 25 mcg  25 mcg Oral Daily Tamera Cash MD       • metoprolol succinate XL (TOPROL-XL) 24 hr half tablet 12.5 mg  12.5 mg Oral Daily Tamera Cash MD   12.5 mg at 12/19/17 1356   • [MAR Hold] mirtazapine (REMERON) tablet 30 mg  30 mg Oral Nightly Tamera Cash MD       • [MAR Hold] morphine injection 2 mg  2 mg Intravenous Q3H PRN Tamera Cash MD   2 mg at 12/19/17 1356   • [MAR Hold] OLANZapine (zyPREXA) tablet 5 mg  5 mg Oral Nightly Tamera Cash MD       • [MAR Hold] pantoprazole (PROTONIX) EC tablet 40 mg  40 mg Oral QAM Tamera Cash MD       • [MAR Hold] polyethylene glycol (MIRALAX) powder 17 g  17 g Oral BID Tamera Cash MD       • [MAR Hold] sertraline (ZOLOFT) tablet 100 mg  100 mg Oral BID Tamera Cash MD       • [MAR Hold] tamsulosin (FLOMAX) 24 hr capsule 0.4 mg  0.4 mg Oral Nightly Tamera Cash MD             Assessment/Plan     Principal Problem:    Displaced fracture of base of neck of left femur, initial encounter for closed fracture  Active Problems:    Paroxysmal atrial fibrillation    Essential hypertension    History of subdural hematoma    H/O unilateral nephrectomy          -Orthopedic evaluation appreciated.  -Patient is scheduled for orthopedic procedure done later part of the afternoon.  -We'll continue with pain  management.  -We'll resume patient's home medication as prior to coming to hospital.  -DVT and GI prophylaxis in place.    -We'll continue monitoring patient hospital setting and treat patient as course dictates.          This document has been electronically signed by Hersno James MD on December 19, 2017 3:10 PM        EMR Dragon/Transcription disclaimer:   Dictated utilizing Dragon dictation.

## 2017-12-20 PROBLEM — I48.91 ATRIAL FIBRILLATION (HCC): Status: ACTIVE | Noted: 2017-12-19

## 2017-12-20 PROBLEM — I10 HYPERTENSION: Chronic | Status: ACTIVE | Noted: 2017-12-20

## 2017-12-20 PROBLEM — F03.918 DEMENTIA WITH BEHAVIORAL DISTURBANCE (HCC): Chronic | Status: ACTIVE | Noted: 2017-12-18

## 2017-12-20 PROBLEM — R50.9 FEVER: Chronic | Status: ACTIVE | Noted: 2017-12-20

## 2017-12-20 LAB
ALBUMIN SERPL-MCNC: 3.4 G/DL (ref 3.4–4.8)
ALBUMIN/GLOB SERPL: 1.2 G/DL (ref 1.1–1.8)
ALP SERPL-CCNC: 61 U/L (ref 38–126)
ALT SERPL W P-5'-P-CCNC: 23 U/L (ref 21–72)
ANION GAP SERPL CALCULATED.3IONS-SCNC: 13 MMOL/L (ref 5–15)
AST SERPL-CCNC: 36 U/L (ref 17–59)
BASOPHILS # BLD AUTO: 0.01 10*3/MM3 (ref 0–0.2)
BASOPHILS NFR BLD AUTO: 0.1 % (ref 0–2)
BILIRUB CONJ SERPL-MCNC: 0 MG/DL (ref 0–0.3)
BILIRUB SERPL-MCNC: 0.6 MG/DL (ref 0.2–1.3)
BUN BLD-MCNC: 16 MG/DL (ref 7–21)
BUN/CREAT SERPL: 17 (ref 7–25)
CALCIUM SPEC-SCNC: 8.3 MG/DL (ref 8.4–10.2)
CHLORIDE SERPL-SCNC: 99 MMOL/L (ref 95–110)
CO2 SERPL-SCNC: 24 MMOL/L (ref 22–31)
CREAT BLD-MCNC: 0.94 MG/DL (ref 0.7–1.3)
DEPRECATED RDW RBC AUTO: 46.9 FL (ref 35.1–43.9)
EOSINOPHIL # BLD AUTO: 0.03 10*3/MM3 (ref 0–0.7)
EOSINOPHIL NFR BLD AUTO: 0.3 % (ref 0–7)
ERYTHROCYTE [DISTWIDTH] IN BLOOD BY AUTOMATED COUNT: 13.7 % (ref 11.5–14.5)
GFR SERPL CREATININE-BSD FRML MDRD: 79 ML/MIN/1.73 (ref 60–98)
GLOBULIN UR ELPH-MCNC: 2.9 GM/DL (ref 2.3–3.5)
GLUCOSE BLD-MCNC: 85 MG/DL (ref 60–100)
HCT VFR BLD AUTO: 30.7 % (ref 39–49)
HGB BLD-MCNC: 10 G/DL (ref 13.7–17.3)
IMM GRANULOCYTES # BLD: 0.03 10*3/MM3 (ref 0–0.02)
IMM GRANULOCYTES NFR BLD: 0.3 % (ref 0–0.5)
LYMPHOCYTES # BLD AUTO: 0.79 10*3/MM3 (ref 0.6–4.2)
LYMPHOCYTES NFR BLD AUTO: 7.3 % (ref 10–50)
MCH RBC QN AUTO: 30.7 PG (ref 26.5–34)
MCHC RBC AUTO-ENTMCNC: 32.6 G/DL (ref 31.5–36.3)
MCV RBC AUTO: 94.2 FL (ref 80–98)
MONOCYTES # BLD AUTO: 1.08 10*3/MM3 (ref 0–0.9)
MONOCYTES NFR BLD AUTO: 10 % (ref 0–12)
NEUTROPHILS # BLD AUTO: 8.83 10*3/MM3 (ref 2–8.6)
NEUTROPHILS NFR BLD AUTO: 82 % (ref 37–80)
PLATELET # BLD AUTO: 154 10*3/MM3 (ref 150–450)
PMV BLD AUTO: 10.8 FL (ref 8–12)
POTASSIUM BLD-SCNC: 4.6 MMOL/L (ref 3.5–5.1)
PROT SERPL-MCNC: 6.3 G/DL (ref 6.3–8.6)
RBC # BLD AUTO: 3.26 10*6/MM3 (ref 4.37–5.74)
SODIUM BLD-SCNC: 136 MMOL/L (ref 137–145)
WBC NRBC COR # BLD: 10.77 10*3/MM3 (ref 3.2–9.8)

## 2017-12-20 PROCEDURE — G8979 MOBILITY GOAL STATUS: HCPCS | Performed by: PHYSICAL THERAPIST

## 2017-12-20 PROCEDURE — 97530 THERAPEUTIC ACTIVITIES: CPT | Performed by: PHYSICAL THERAPIST

## 2017-12-20 PROCEDURE — A9270 NON-COVERED ITEM OR SERVICE: HCPCS | Performed by: INTERNAL MEDICINE

## 2017-12-20 PROCEDURE — 63710000001 ATORVASTATIN 10 MG TABLET: Performed by: INTERNAL MEDICINE

## 2017-12-20 PROCEDURE — 87040 BLOOD CULTURE FOR BACTERIA: CPT | Performed by: INTERNAL MEDICINE

## 2017-12-20 PROCEDURE — 63710000001 SERTRALINE 50 MG TABLET: Performed by: INTERNAL MEDICINE

## 2017-12-20 PROCEDURE — 80053 COMPREHEN METABOLIC PANEL: CPT | Performed by: INTERNAL MEDICINE

## 2017-12-20 PROCEDURE — 63710000001 HYDROCODONE-ACETAMINOPHEN 7.5-325 MG TABLET: Performed by: ORTHOPAEDIC SURGERY

## 2017-12-20 PROCEDURE — 25010000002 CEFAZOLIN PER 500 MG: Performed by: ORTHOPAEDIC SURGERY

## 2017-12-20 PROCEDURE — 25010000002 ENOXAPARIN PER 10 MG: Performed by: ORTHOPAEDIC SURGERY

## 2017-12-20 PROCEDURE — 82248 BILIRUBIN DIRECT: CPT | Performed by: INTERNAL MEDICINE

## 2017-12-20 PROCEDURE — G8978 MOBILITY CURRENT STATUS: HCPCS | Performed by: PHYSICAL THERAPIST

## 2017-12-20 PROCEDURE — 63710000001 PANTOPRAZOLE 40 MG TABLET DELAYED-RELEASE: Performed by: INTERNAL MEDICINE

## 2017-12-20 PROCEDURE — 63710000001 LEVOTHYROXINE 25 MCG TABLET: Performed by: INTERNAL MEDICINE

## 2017-12-20 PROCEDURE — 25810000003 SODIUM CHLORIDE 0.9 % WITH KCL 20 MEQ 20-0.9 MEQ/L-% SOLUTION: Performed by: ORTHOPAEDIC SURGERY

## 2017-12-20 PROCEDURE — 97110 THERAPEUTIC EXERCISES: CPT

## 2017-12-20 PROCEDURE — 97162 PT EVAL MOD COMPLEX 30 MIN: CPT | Performed by: PHYSICAL THERAPIST

## 2017-12-20 PROCEDURE — 63710000001 CHOLECALCIFEROL 1000 UNITS TABLET: Performed by: INTERNAL MEDICINE

## 2017-12-20 PROCEDURE — 85025 COMPLETE CBC W/AUTO DIFF WBC: CPT | Performed by: INTERNAL MEDICINE

## 2017-12-20 PROCEDURE — 63710000001 AMIODARONE 200 MG TABLET: Performed by: INTERNAL MEDICINE

## 2017-12-20 PROCEDURE — 63710000001 METOPROLOL SUCCINATE XL 12.5 MG TABLET SUSTAINED-RELEASE 24 HOUR: Performed by: INTERNAL MEDICINE

## 2017-12-20 PROCEDURE — 25010000003 CEFAZOLIN PER 500 MG: Performed by: ORTHOPAEDIC SURGERY

## 2017-12-20 PROCEDURE — A9270 NON-COVERED ITEM OR SERVICE: HCPCS | Performed by: ORTHOPAEDIC SURGERY

## 2017-12-20 RX ORDER — IBUPROFEN 400 MG/1
400 TABLET ORAL EVERY 6 HOURS PRN
Status: DISCONTINUED | OUTPATIENT
Start: 2017-12-20 | End: 2017-12-20

## 2017-12-20 RX ADMIN — HYDROCODONE BITARTRATE AND ACETAMINOPHEN 1 TABLET: 7.5; 325 TABLET ORAL at 09:24

## 2017-12-20 RX ADMIN — HYDROCODONE BITARTRATE AND ACETAMINOPHEN 1 TABLET: 7.5; 325 TABLET ORAL at 03:33

## 2017-12-20 RX ADMIN — ENOXAPARIN SODIUM 40 MG: 40 INJECTION SUBCUTANEOUS at 09:59

## 2017-12-20 RX ADMIN — POLYETHYLENE GLYCOL 3350 17 G: 17 POWDER, FOR SOLUTION ORAL at 17:05

## 2017-12-20 RX ADMIN — TAMSULOSIN HYDROCHLORIDE 0.4 MG: 0.4 CAPSULE ORAL at 20:59

## 2017-12-20 RX ADMIN — DOCUSATE SODIUM 100 MG: 100 CAPSULE, LIQUID FILLED ORAL at 09:24

## 2017-12-20 RX ADMIN — VITAMIN D, TAB 1000IU (100/BT) 5000 UNITS: 25 TAB at 07:47

## 2017-12-20 RX ADMIN — PANTOPRAZOLE SODIUM 40 MG: 40 TABLET, DELAYED RELEASE ORAL at 06:09

## 2017-12-20 RX ADMIN — POTASSIUM CHLORIDE AND SODIUM CHLORIDE 100 ML/HR: 900; 150 INJECTION, SOLUTION INTRAVENOUS at 19:00

## 2017-12-20 RX ADMIN — HYDROCODONE BITARTRATE AND ACETAMINOPHEN 1 TABLET: 7.5; 325 TABLET ORAL at 17:00

## 2017-12-20 RX ADMIN — Medication 12.5 MG: at 07:48

## 2017-12-20 RX ADMIN — DOCUSATE SODIUM 100 MG: 100 CAPSULE, LIQUID FILLED ORAL at 20:59

## 2017-12-20 RX ADMIN — WATER 2 G: 1 INJECTION INTRAMUSCULAR; INTRAVENOUS; SUBCUTANEOUS at 06:09

## 2017-12-20 RX ADMIN — LEVOTHYROXINE SODIUM 25 MCG: 25 TABLET ORAL at 07:47

## 2017-12-20 RX ADMIN — ATORVASTATIN CALCIUM 10 MG: 10 TABLET, FILM COATED ORAL at 07:47

## 2017-12-20 RX ADMIN — SERTRALINE HYDROCHLORIDE 100 MG: 50 TABLET ORAL at 07:47

## 2017-12-20 RX ADMIN — OLANZAPINE 5 MG: 5 TABLET, FILM COATED ORAL at 20:59

## 2017-12-20 RX ADMIN — WATER 2 G: 1 INJECTION INTRAMUSCULAR; INTRAVENOUS; SUBCUTANEOUS at 14:51

## 2017-12-20 RX ADMIN — POTASSIUM CHLORIDE AND SODIUM CHLORIDE 100 ML/HR: 900; 150 INJECTION, SOLUTION INTRAVENOUS at 07:51

## 2017-12-20 RX ADMIN — HYDROCODONE BITARTRATE AND ACETAMINOPHEN 1 TABLET: 7.5; 325 TABLET ORAL at 23:34

## 2017-12-20 RX ADMIN — SERTRALINE HYDROCHLORIDE 100 MG: 50 TABLET ORAL at 17:04

## 2017-12-20 RX ADMIN — MIRTAZAPINE 30 MG: 15 TABLET, FILM COATED ORAL at 20:59

## 2017-12-20 RX ADMIN — AMIODARONE HYDROCHLORIDE 200 MG: 200 TABLET ORAL at 07:48

## 2017-12-20 NOTE — PROGRESS NOTES
No c/o  Vitals:    12/20/17 0736   BP: 170/76   Pulse: 81   Resp: 16   Temp: 99.1 °F (37.3 °C)   SpO2: 94%     Alert, comfortable  Dressing intact   knee immobilizer in place left leg  Dorsiflexion and plantarflexion intact left left  Sensation intact to touch  No signs dvt/pe  Pulse present left leg  Weight bearing as tolerated, left leg  Physical therapy  dvt prophylaxis

## 2017-12-20 NOTE — PLAN OF CARE
Problem: Patient Care Overview (Adult)  Goal: Plan of Care Review  Outcome: Ongoing (interventions implemented as appropriate)   12/20/17 0505   Coping/Psychosocial Response Interventions   Plan Of Care Reviewed With patient   Patient Care Overview   Progress unable to show any progress toward functional goals   Outcome Evaluation   Outcome Summary/Follow up Plan Pt did not meet any PT goals this tx. Pt dep w/all mobility. Pt needs SNF placement.

## 2017-12-20 NOTE — PLAN OF CARE
Problem: Patient Care Overview (Adult)  Goal: Plan of Care Review   12/20/17 1611   Coping/Psychosocial Response Interventions   Plan Of Care Reviewed With patient   Patient Care Overview   Progress improving   Outcome Evaluation   Outcome Summary/Follow up Plan patient had a fever today. completed all antibotics. working with therapy. assist of 2 to chair. removed dressing today,. patient urinated all over dressing and immoblizer and replce it and left dressigng off.. clean it chlorprep.      Goal: Adult Individualization and Mutuality  Outcome: Ongoing (interventions implemented as appropriate)    Goal: Discharge Needs Assessment  Outcome: Ongoing (interventions implemented as appropriate)      Problem: Fall Risk (Adult)  Goal: Absence of Falls  Outcome: Ongoing (interventions implemented as appropriate)

## 2017-12-20 NOTE — PLAN OF CARE
Problem: Patient Care Overview (Adult)  Goal: Plan of Care Review  Outcome: Ongoing (interventions implemented as appropriate)    12/19/17 1942   Coping/Psychosocial Response Interventions   Plan Of Care Reviewed With patient   Patient Care Overview   Progress no change   Outcome Evaluation   Outcome Summary/Follow up Plan vss meets pace dc criteria          Problem: Perioperative Period (Adult)  Goal: Signs and Symptoms of Listed Potential Problems Will be Absent or Manageable (Perioperative Period)  Outcome: Ongoing (interventions implemented as appropriate)

## 2017-12-20 NOTE — OP NOTE
Pre operative diagnosis left femoral neck fracture, osteoporosis, schizophrenia  Surgical diagnosis left femoral neck fracture, osteoporosis, schizophrenia  Surgery; Left hip hemiarthroplasty, bipolar replacement for femoral neck fracture  [facility]  General anesthesia  Blood loss 150  FM is anesthetized, intubated and positioned lateral.  The trunk and extremities carefully positioned.  The left hip washed with chloroprep sterile wash, and surgical barriers placed, all of which are sterile.  Consent antibiotics and xrays are confirmed.  Left hip Incision 8cm over the greater trochanter, curvilinear.  Dissection to the gluteus rehan, split in line with the skin incision, through the lateral fascia of the thigh.  Retractors are placed.  The piriformis tendon identified, sutured and cut at the insertion, posterior capsular tissues dissected from the posterior aspect of the femoral neck, alongwith the gemelli, obturator and quadratus muscles.  The femoral neck fracture identified, disimpacted and remnant femoral neck extricated.  The femoral neck is exposed and revised the neck cut with oscillating saw blade.  The remnant femoral head is removed from the acetabulum, and measured for size, measuring 52. The femoral canal identified and broached sequentially to a size 12.  The calcar is reamed, and trialed with a 50 bipolar acetabular component, reduced the hip and found to have a stable fit, shortened leg length.  The hip is dislocated with flexion to 110 and internal rotation.   I removed the trials, cleared the acetabulum, irrigated the femur with the pulsavac.  The corail 12 implant is impacted into the proximal femur with correct version.  The implant is stable.  I trialed with a +5-50 bipolar, reduced the hip and assessed the leg lengths and stability.  The leg length was within 2mm of the right leg, the implant was stable to flexion of 110, internal rotation of 20.   After removing the  trials, I placed the final implants with a  +5-50 bipolar acetabular component.  The hip is irrigated, I cleared the acetabulum and reduced the hip with traction and external rotation.  The hip was reduced, and stable to 110 degrees of flexion and 20 degrees of internal rotation.  Leg lengths were excellent.  I irrigated the hip again, and performed a posterior capsular repair with fiberwire, anchored to the greater trochanter, and repair of the piriformis tendon.  I sutured closed in layered fashion the fascial layer, the subcutaneous layer and placed staples in the skin.  Sterile dressings were placed.   The patient is extubated, taken to recovery.  No complications

## 2017-12-20 NOTE — BRIEF OP NOTE
HIP HEMIARTHROPLASTY  Progress Note    Nickolas Muhammad  12/19/2017    Pre-op Diagnosis:   Displaced fracture of base of neck of left femur, initial encounter for closed fracture [S72.042A]       Post-Op Diagnosis Codes:     * Displaced fracture of base of neck of left femur, initial encounter for closed fracture [S72.042A]    Procedure/CPT® Codes:  MO FEMORAL FX, OPEN TX [45170]    Procedure(s):  HIP HEMIARTHROPLASTY    Surgeon(s):  Javan Talley MD    Anesthesia: General    Staff:   Circulator: Ashley Peña RN; Jennifer Navas RN  Scrub Person: Pooja Byrne; Negra Templeton; Marie Mcpherson V  Assistant: Afia Perez CSA    Estimated Blood Loss: 150 mL    Urine Voided: * No values recorded between 12/19/2017  4:38 PM and 12/19/2017  6:57 PM *    Specimens:                  ID Type Source Tests Collected by Time Destination   A : LEFT FEMORAL HEAD Bone Hip, Left TISSUE EXAM Javan Talley MD 12/19/2017 1823          Drains:           Findings: left femoral neck fracture    Complications: none      Javan Talley MD     Date: 12/19/2017  Time: 6:57 PM

## 2017-12-20 NOTE — PLAN OF CARE
Problem: Patient Care Overview (Adult)  Goal: Plan of Care Review  Outcome: Ongoing (interventions implemented as appropriate)   12/20/17 0914   Coping/Psychosocial Response Interventions   Plan Of Care Reviewed With patient   Outcome Evaluation   Outcome Summary/Follow up Plan PT eval completed, patient confused and paranoid and didn't want to sit or move either leg but was able to eventually get to sit EOB with max assist of therapist, had only fair sitting balance, attempted coming to stand but not successful due to pain and lack of effort on patient part, could benefit from skilled PT to regain and return to highest level of function in bed mobility, transfers, gait ,strength, and knowledge of hip precautions     Goal: Discharge Needs Assessment  Outcome: Ongoing (interventions implemented as appropriate)   12/20/17 0914   Discharge Needs Assessment   Concerns To Be Addressed adjustment to diagnosis/illness concerns;cognitive/perceptual concerns   Equipment Needed After Discharge (facility will provide)   Discharge Facility/Level Of Care Needs nursing facility, skilled   Current Discharge Risk physical impairment;cognitively impaired;dependent with mobility/activities of daily living   Discharge Disposition still a patient   Current Health   Outpatient/Agency/Support Group Needs skilled nursing facility (specify)   Self-Care   Equipment Currently Used at Home wheelchair   Living Environment   Transportation Available ambulance       Problem: Inpatient Physical Therapy  Goal: Bed Mobility Goal STG- PT  Outcome: Ongoing (interventions implemented as appropriate)   12/20/17 0914   Bed Mobility PT STG   Bed Mobility PT STG, Date Established 12/20/17   Bed Mobility PT STG, Time to Achieve 2 - 3 days   Bed Mobility PT STG, Activity Type supine to sit/sit to supine   Bed Mobility PT STG, [place] Level contact guard assist   Transfer Training Goal, Assist Device bed rails   Bed Mobility PT STG, Additional Goal HOB  up and follwoing hip precautions     Goal: Transfer Training Goal 1 STG- PT  Outcome: Ongoing (interventions implemented as appropriate)   12/20/17 0914   Transfer Training PT STG   Transfer Training PT STG, Date Established 12/20/17   Transfer Training PT STG, Time to Achieve 2 - 3 days   Transfer Training PT STG, Activity Type bed to chair /chair to bed;sit to stand/stand to sit   Transfer Training PT STG, [place] Level contact guard assist   Transfer Training PT STG, Assist Device walker, rolling     Goal: Gait Training Goal LTG- PT  Outcome: Ongoing (interventions implemented as appropriate)   12/20/17 0914   Gait Training PT LTG   Gait Training Goal PT LTG, Date Established 12/20/17   Gait Training Goal PT LTG, Time to Achieve by discharge   Gait Training Goal PT LTG, [place] Level contact guard assist   Gait Training Goal PT LTG, Assist Device walker, rolling   Gait Training Goal PT LTG, Distance to Achieve 100 feet     Goal: Strength Goal LTG- PT  Outcome: Ongoing (interventions implemented as appropriate)   12/20/17 0914   Strength Goal PT LTG   Strength Goal PT LTG, Date Established 12/20/17   Strength Goal PT LTG, Time to Achieve by discharge   Strength Goal PT LTG, Measure to Achieve 20 reps all ex BLE activiely   Strength Goal PT LTG, Functional Goal get legs up onto bed following hip precautions     Goal: Physical Therapy Goal STG- PT  Outcome: Ongoing (interventions implemented as appropriate)   12/20/17 0914   Physical Therapy PT STG   Physical Therapy PT STG, Date Established 12/20/17   Physical Therapy PT STG, Time to Achieve 2 - 3 days   Physical Therapy PT STG, Activity Type be able to verbalize hip precautions and follow them   Physical Therapy PT STG, [place] Level independent;verbal cues required

## 2017-12-20 NOTE — THERAPY EVALUATION
Acute Care - Physical Therapy Initial Evaluation  [facility]     Patient Name: Nickolas Muhammad  : 1945  MRN: [id number]  Today's Date: 2017   Onset of Illness/Injury or Date of Surgery Date: 17  Date of Referral to PT: 17  Referring Physician: Dr Talley      Admit Date: 2017     Visit Dx:    ICD-10-CM ICD-9-CM   1. Displaced fracture of base of neck of left femur, initial encounter for closed fracture S72.042A 820.03   2. Impaired physical mobility Z74.09 781.99     Patient Active Problem List   Diagnosis   • Depression   • Dementia with behavioral disturbance   • Displaced fracture of base of neck of left femur, initial encounter for closed fracture   • Atrial fibrillation   • History of subdural hematoma   • H/O unilateral nephrectomy   • Hypertension   • Fever     Past Medical History:   Diagnosis Date   • Anxiety    • Cancer    • Depression    • Schizoaffective disorder      Past Surgical History:   Procedure Laterality Date   • CARDIAC SURGERY            PT ASSESSMENT (last 72 hours)      PT Evaluation       17 0914 17 0305    Rehab Evaluation    Document Type evaluation  -CB     Subjective Information agree to therapy  -CB     Patient Effort, Rehab Treatment fair  -CB     Patient Effort, Rehab Treatment Comment confused and wanting to wait till later to move  -CB     Symptoms Noted During/After Treatment shortness of breath   becomes anxious and starts to breathe faster but if told to   -CB     General Information    Patient Profile Review yes  -CB     Onset of Illness/Injury or Date of Surgery Date 17  -CB     Referring Physician Dr Talley  -CB     General Observations laying in bed, L knee immobilizer in place has telemetry IV, SCD   -CB     Pertinent History Of Current Problem on physch and fell with painful L hip found to have frx  -CB     Precautions/Limitations hip precautions- left  -CB     Prior Level of Function independent:;gait;ADL's  -CB      Equipment Currently Used at Home wheelchair  -CB wheelchair;walker, rolling  -MM    Plans/Goals Discussed With patient  -CB     Risks Reviewed patient:;increased discomfort  -CB     Benefits Reviewed patient:;improve function;increase strength;decrease pain;decrease risk of DVT  -CB     Barriers to Rehab cognitive status  -CB     Living Environment    Lives With facility resident  -CB     Living Arrangements residential facility  -CB     Home Accessibility no concerns  -CB     Transportation Available ambulance  -CB     Living Environment Comment information patient gave was totally incorrect  -CB     Clinical Impression    Date of Referral to PT 12/09/17  -CB     PT Diagnosis impaired physical mobility due to L ip frx s/p hemiarthroplasty posterior approach  -CB     Criteria for Skilled Therapeutic Interventions Met treatment indicated;yes  -CB     Pathology/Pathophysiology Noted (Describe Specifically for Each System) musculoskeletal  -CB     Impairments Found (describe specific impairments) gait, locomotion, and balance;arousal, attention, and cognition  -CB     Functional Limitations in Following Categories (Describe Specific Limitations) self-care;home management  -CB     Rehab Potential fair, will monitor progress closely  -CB     Predicted Duration of Therapy Intervention (days/wks) until goals met or discharged  -CB     Vital Signs    Pre Systolic BP Rehab 163  -CB     Pre Treatment Diastolic BP 72  -CB     Post Systolic BP Rehab 158  -CB     Post Treatment Diastolic BP 68  -CB     Pretreatment Heart Rate (beats/min) 82  -CB     Posttreatment Heart Rate (beats/min) 78  -CB     Pre SpO2 (%) 92  -CB     O2 Delivery Pre Treatment room air  -CB     Post SpO2 (%) 93  -CB     O2 Delivery Post Treatment room air  -CB     Pre Patient Position Supine  -CB     Intra Patient Position Sitting  -CB     Post Patient Position Supine  -CB     Pain Assessment    Pain Assessment 0-10  -CB     Pain Score 10  -CB     Post Pain  Score 10  -CB     Pain Location Hip  -CB     Pain Orientation Left  -CB     Vision Assessment/Intervention    Visual Impairment WFL with corrective lenses  -CB     Cognitive Assessment/Intervention    Current Cognitive/Communication Assessment impaired  -CB     Orientation Status oriented to;place;time   would not give out his name- personal info  -CB     Follows Commands/Answers Questions 75% of the time;needs cueing;needs repetition  -CB     Personal Safety mild impairment  -CB     Personal Safety Interventions fall prevention program maintained;gait belt;nonskid shoes/slippers when out of bed  -CB     ROM (Range of Motion)    General ROM lower extremity range of motion deficits identified  -CB     General ROM Detail L ip flexion limited by pain  -CB     MMT (Manual Muscle Testing)    General MMT Assessment lower extremity strength deficits identified  -CB     General MMT Assessment Detail not able to test - assisted with movement   -CB     Mobility Assessment/Training    Extremity Weight-Bearing Status left lower extremity;right lower extremity  -CB     Left Lower Extremity Weight-Bearing weight-bearing as tolerated  -CB     Bed Mobility, Assessment/Treatment    Bed Mob, Supine to Sit, [place] moderate assist (50% patient effort);maximum assist (25% patient effort)  -CB     Bed Mob, Sit to Supine, [place] moderate assist (50% patient effort)  -CB     Bed Mobility, Comment didn't think either leg would work- both were broken, could not move them  -CB     Transfer Assessment/Treatment    Transfers, Sit-Stand [place] unable to perform;maximum assist (25% patient effort)  -CB     Transfers, Stand-Sit [place] maximum assist (25% patient effort)  -CB     Transfers, Sit-Stand-Sit, Assist Device rolling walker  -CB     Transfer, Comment when attempting got better respone and would try better if standing because bed needed to be changed as opposed to standing because he needed to walk  -CB     Gait  Assessment/Treatment    Gait, [place] Level moderate assist (50% patient effort);2 person assist required  -CB     Gait, Assistive Device rolling walker  -CB     Gait, Distance (Feet) 2  -CB     Gait, Safety Issues weight-shifting ability decreased  -CB     Gait, Impairments pain;strength decreased  -CB     Orthotics Prosthetics    Additional Documentation Orthosis Location (Group);Orthosis Management/Training (Group)  -CB     Orthosis Location    Orthosis Location/Type lower extremity  -CB     Orthosis Management/Training    Orthosis Indications immobilize, protect/position healing structures;restrict motion   to prevent hip dislocation on L  -CB     Orthosis Wear Schedule wear full time   Dr Talley will write orders when ready for immobilizer to b  -CB     Orthosis Schedule Comment will order ex to L knee when ready  -CB     Sensory Assessment/Intervention    Light Touch --   not able to test  -CB     Positioning and Restraints    Pre-Treatment Position in bed  -CB     Post Treatment Position bed  -CB     In Bed supine;call light within reach;encouraged to call for assist;exit alarm on;with nsg;side rails up x2;L knee immobilizer  -CB       12/19/17 0215 12/19/17 0207    General Information    Equipment Currently Used at Home  wheelchair;walker, rolling  -MM    Living Environment    Lives With facility resident  -MM     Living Arrangements residential facility  -MM     Home Accessibility no concerns  -MM     Stair Railings at Home none  -MM     Type of Financial/Environmental Concern none  -MM     Transportation Available other (see comments)   SNF patient  -MM       User Key  (r) = Recorded By, (t) = Taken By, (c) = Cosigned By    Initials Name Provider Type    CB Gisselle Perales, PT Physical Therapist    MM Tone Armijo RN Registered Nurse          Physical Therapy Education     Title: PT OT SLP Therapies (Active)     Topic: Physical Therapy (Active)     Point: Mobility training (Active)    Learning  Progress Summary    Learner Readiness Method Response Comment Documented by Status   Patient Nonacceptance D NR,NL   12/20/17 1257 Active               Point: Precautions (Active)    Learning Progress Summary    Learner Readiness Method Response Comment Documented by Status   Patient Nonacceptance D NR,NL   12/20/17 1257 Active                      User Key     Initials Effective Dates Name Provider Type Discipline     04/06/17 -  Gisselle Perales, PT Physical Therapist PT                PT Recommendation and Plan  Anticipated Equipment Needs At Discharge:  (facility will provide)  Anticipated Discharge Disposition: skilled nursing facility  Planned Therapy Interventions: bed mobility training, gait training, home exercise program, transfer training, strengthening, patient/family education  PT Frequency: per priority policy (5-14)  Plan of Care Review  Plan Of Care Reviewed With: patient  Outcome Summary/Follow up Plan: PT trung completed, patient confused and paranoid and didn't want to sit or move either leg but was able to eventually get to sit EOB with max assist of therapist, had only fair sitting balance, attempted coming to stand but not successful due to pain and  lack of effort on patient part, could benefit from skilled PT to regain and return to highest level of function in bed mobility, transfers, gait ,strength, and knowledge of hip precautions          IP PT Goals       12/20/17 0914          Bed Mobility PT STG    Bed Mobility PT STG, Date Established 12/20/17  -CB      Bed Mobility PT STG, Time to Achieve 2 - 3 days  -CB      Bed Mobility PT STG, Activity Type supine to sit/sit to supine  -CB      Bed Mobility PT STG, [place] Level contact guard assist  -CB      Transfer Training Goal, Assist Device bed rails  -CB      Bed Mobility PT STG, Additional Goal HOB up and follwoing hip precautions  -CB      Transfer Training PT STG    Transfer Training PT STG, Date Established 12/20/17  -CB       Transfer Training PT STG, Time to Achieve 2 - 3 days  -CB      Transfer Training PT STG, Activity Type bed to chair /chair to bed;sit to stand/stand to sit  -CB      Transfer Training PT STG, [place] Level contact guard assist  -CB      Transfer Training PT STG, Assist Device walker, rolling  -CB      Gait Training PT LTG    Gait Training Goal PT LTG, Date Established 12/20/17  -CB      Gait Training Goal PT LTG, Time to Achieve by discharge  -CB      Gait Training Goal PT LTG, [place] Level contact guard assist  -CB      Gait Training Goal PT LTG, Assist Device walker, rolling  -CB      Gait Training Goal PT LTG, Distance to Achieve 100 feet  -CB      Strength Goal PT LTG    Strength Goal PT LTG, Date Established 12/20/17  -CB      Strength Goal PT LTG, Time to Achieve by discharge  -CB      Strength Goal PT LTG, Measure to Achieve 20 reps all ex BLE activiely  -CB      Strength Goal PT LTG, Functional Goal get legs up onto bed following hip precautions  -CB      Physical Therapy PT STG    Physical Therapy PT STG, Date Established 12/20/17  -CB      Physical Therapy PT STG, Time to Achieve 2 - 3 days  -CB      Physical Therapy PT STG, Activity Type be able to verbalize hip precautions and follow them  -CB      Physical Therapy PT STG, [place] Level independent;verbal cues required  -CB        User Key  (r) = Recorded By, (t) = Taken By, (c) = Cosigned By    Initials Name Provider Type    CB Gisselle Perales, PT Physical Therapist                Outcome Measures       12/20/17 0914          How much help from another person do you currently need...    Turning from your back to your side while in flat bed without using bedrails? 2  -CB      Moving from lying on back to sitting on the side of a flat bed without bedrails? 2  -CB      Moving to and from a bed to a chair (including a wheelchair)? 1  -CB      Standing up from a chair using your arms (e.g., wheelchair, bedside chair)? 2  -CB      Climbing 3-5  steps with a railing? 1  -CB      To walk in hospital room? 1  -CB      AM-PAC 6 Clicks Score 9  -CB      Functional Assessment    Outcome Measure Options AM-PAC 6 Clicks Basic Mobility (PT)  -CB        User Key  (r) = Recorded By, (t) = Taken By, (c) = Cosigned By    Initials Name Provider Type    CB Gisselle Perales, PT Physical Therapist           Time Calculation:         PT Charges       12/20/17 1306          Time Calculation    Start Time 0914  -CB      Stop Time 0958  -CB      Time Calculation (min) 44 min  -CB      PT Received On 12/20/17  -CB      PT Goal Re-Cert Due Date 01/02/18  -CB      Time Calculation- PT    Total Timed Code Minutes- PT 29 minute(s)  -CB        User Key  (r) = Recorded By, (t) = Taken By, (c) = Cosigned By    Initials Name Provider Type    CB Gisselle Perales, PT Physical Therapist          Therapy Charges for Today     Code Description Service Date Service Provider Modifiers Qty    70337813233 HC PT MOBILITY CURRENT 12/20/2017 Gisselle Perales, PT GP, CL 1    73890502170 HC PT MOBILITY PROJECTED 12/20/2017 Gisselle Perales, PT GP, CK 1    56476341416 HC PT EVAL MOD COMPLEXITY 1 12/20/2017 Gisselle Perales, PT GP 1    25763046256 HC PT THERAPEUTIC ACT EA 15 MIN 12/20/2017 Gisselle Perales, PT GP 2          PT G-Codes  PT Professional Judgement Used?: Yes  Outcome Measure Options: AM-PAC 6 Clicks Basic Mobility (PT)  Score: 9  Functional Limitation: Mobility: Walking and moving around  Mobility: Walking and Moving Around Current Status (): At least 60 percent but less than 80 percent impaired, limited or restricted  Mobility: Walking and Moving Around Goal Status (): At least 40 percent but less than 60 percent impaired, limited or restricted      Gisselle Perales, PT  12/20/2017

## 2017-12-20 NOTE — PLAN OF CARE
Problem: Patient Care Overview (Adult)  Goal: Plan of Care Review  Outcome: Ongoing (interventions implemented as appropriate)   12/20/17 0432   Coping/Psychosocial Response Interventions   Plan Of Care Reviewed With patient   Patient Care Overview   Progress no change   Outcome Evaluation   Outcome Summary/Follow up Plan new admit.     Goal: Adult Individualization and Mutuality  Outcome: Ongoing (interventions implemented as appropriate)    Goal: Discharge Needs Assessment  Outcome: Ongoing (interventions implemented as appropriate)      Problem: Fall Risk (Adult)  Goal: Absence of Falls  Outcome: Ongoing (interventions implemented as appropriate)      Problem: Perioperative Period (Adult)  Goal: Signs and Symptoms of Listed Potential Problems Will be Absent or Manageable (Perioperative Period)  Outcome: Outcome(s) achieved Date Met: 12/20/17

## 2017-12-20 NOTE — THERAPY TREATMENT NOTE
Acute Care - Physical Therapy Treatment Note  [facility]     Patient Name: Nickolas Muhammad  : 1945  MRN: [id number]  Today's Date: 2017  Onset of Illness/Injury or Date of Surgery Date: 17  Date of Referral to PT: 17  Referring Physician: Dr Talley    Admit Date: 2017    Visit Dx:    ICD-10-CM ICD-9-CM   1. Displaced fracture of base of neck of left femur, initial encounter for closed fracture S72.042A 820.03   2. Impaired physical mobility Z74.09 781.99     Patient Active Problem List   Diagnosis   • Depression   • Dementia with behavioral disturbance   • Displaced fracture of base of neck of left femur, initial encounter for closed fracture   • Atrial fibrillation   • History of subdural hematoma   • H/O unilateral nephrectomy   • Hypertension   • Fever               Adult Rehabilitation Note       17 1335          Rehab Assessment/Intervention    Discipline physical therapy assistant  -AM      Document Type therapy note (daily note)  -AM      Subjective Information agree to therapy;no complaints  -AM      Patient Effort, Rehab Treatment adequate  -AM      Symptoms Noted During/After Treatment none  -AM      Precautions/Limitations brace on when up;fall precautions;hip precautions- left;other (see comments)   WBAT LLE  -AM      Equipment Issued to Patient gait belt  -AM      Recorded by [AM] Lawson Flaherty, PTA      Vital Signs    Pre Systolic BP Rehab 116  -AM      Pre Treatment Diastolic BP 56  -AM      Post Systolic BP Rehab 110  -AM      Post Treatment Diastolic BP 59  -AM      Pretreatment Heart Rate (beats/min) 95  -AM      Posttreatment Heart Rate (beats/min) 86  -AM      Pre SpO2 (%) 93  -AM      O2 Delivery Pre Treatment room air  -AM      Post SpO2 (%) 92  -AM      O2 Delivery Post Treatment room air  -AM      Pre Patient Position Supine  -AM      Intra Patient Position Standing  -AM      Post Patient Position Supine  -AM      Recorded by [AM] Lawson Flaherty,  BELLE      Pain Assessment    Pain Assessment Unable to assess  -AM      Recorded by [AM] Lawson Flaherty PTA      Cognitive Assessment/Intervention    Current Cognitive/Communication Assessment functional  -AM      Orientation Status oriented x 4  -AM      Follows Commands/Answers Questions able to follow single-step instructions;needs cueing;needs increased time;needs repetition  -AM      Personal Safety Interventions gait belt;nonskid shoes/slippers when out of bed;supervised activity  -AM      Recorded by [AM] Lawson Flaherty PTA      ROM (Range of Motion)    General ROM lower extremity range of motion deficits identified  -AM      Recorded by [AM] Lawson Flaherty PTA      General LE Assessment    ROM LLE ROM was WFL  -AM      Recorded by [AM] Lawson Flaherty PTA      Mobility Assessment/Training    Extremity Weight-Bearing Status left lower extremity  -AM      Left Lower Extremity Weight-Bearing weight-bearing as tolerated  -AM      Recorded by [AM] Lawson Flaherty PTA      Bed Mobility, Assessment/Treatment    Bed Mobility, Assistive Device bed rails;head of bed elevated  -AM      Bed Mobility, Roll Left, [place] not tested  -AM      Bed Mobility, Roll Right, [place] not tested  -AM      Bed Mobility, Scoot/Bridge, [place] not tested  -AM      Bed Mob, Supine to Sit, [place] dependent (less than 25% patient effort)  -AM      Bed Mob, Sit to Supine, [place] dependent (less than 25% patient effort)  -AM      Bed Mob, Sidelying to Sit, [place] not tested  -AM      Bed Mob, Sit to Sidelying, [place] not tested  -AM      Bed Mobility, Safety Issues cognitive deficits limit understanding;decreased use of arms for pushing/pulling;decreased use of legs for bridging/pushing  -AM      Bed Mobility, Impairments ROM decreased;strength decreased  -AM      Recorded by [AM] Lawson Flaherty PTA      Transfer Assessment/Treatment    Transfers, Bed-Chair [place] dependent (less than  25% patient effort);2 person assist required  -AM      Transfers, Chair-Bed [place] dependent (less than 25% patient effort);2 person assist required  -AM      Transfers, Bed-Chair-Bed, Assist Device other (see comments)   HHA  -AM      Transfers, Sit-Stand [place] dependent (less than 25% patient effort);2 person assist required  -AM      Transfers, Stand-Sit [place] dependent (less than 25% patient effort);2 person assist required  -AM      Transfers, Sit-Stand-Sit, Assist Device other (see comments)   HHA  -AM      Toilet Transfer, [place] not tested  -AM      Walk-In Shower Transfer, [place] not tested  -AM      Bathtub Transfer, [place] not tested  -AM      Transfer, Maintain Weight Bearing Status able to maintain weight bearing status  -AM      Transfer, Safety Issues step length decreased  -AM      Transfer, Impairments ROM decreased;strength decreased  -AM      Recorded by [AM] Lawson Flaherty PTA      Gait Assessment/Treatment    Gait, [place] Level not tested  -AM      Gait, Assistive Device --  -AM      Gait, Impairments --  -AM      Recorded by [AM] Lawson Flaherty PTA      Stairs Assessment/Treatment    Stairs, [place] Level not tested  -AM      Recorded by [AM] Lawson Flaherty PTA      Orthotics Prosthetics    Additional Documentation Orthosis Location (Group);Orthosis Management/Training (Group)  -AM      Recorded by [AM] Lawson Flaherty PTA      Orthosis Location    Orthosis Location/Type lower extremity  -AM      Orthosis, Lower Extremity Left:;knee immobilizer  -AM      Recorded by [AM] Lawson Flaherty PTA      Orthosis Management/Training    Orthosis Fabrication Detail Knee Immobilizer  -AM      Orthosis Indications immobilize, protect/position healing structures  -AM      Orthosis Skills Training doffing orthosis;donning orthosis  -AM      Orthosis Wear Schedule wear full time  -AM      Recorded by [AM] Lawson Flaherty PTA      Sensory  Assessment/Intervention    Light Touch --  -AM      Recorded by [AM] Lawson Flaherty PTA      Positioning and Restraints    Pre-Treatment Position in bed  -AM      Post Treatment Position bed  -AM      In Bed supine;call light within reach;encouraged to call for assist;exit alarm on  -AM      Recorded by [AM] Lawson Flaherty PTA        User Key  (r) = Recorded By, (t) = Taken By, (c) = Cosigned By    Initials Name Effective Dates    AM Lawson Flaherty PTA 10/17/16 -                 IP PT Goals       12/20/17 0914          Bed Mobility PT STG    Bed Mobility PT STG, Date Established 12/20/17  -CB      Bed Mobility PT STG, Time to Achieve 2 - 3 days  -CB      Bed Mobility PT STG, Activity Type supine to sit/sit to supine  -CB      Bed Mobility PT STG, [place] Level contact guard assist  -CB      Transfer Training Goal, Assist Device bed rails  -CB      Bed Mobility PT STG, Additional Goal HOB up and follwoing hip precautions  -CB      Transfer Training PT STG    Transfer Training PT STG, Date Established 12/20/17  -CB      Transfer Training PT STG, Time to Achieve 2 - 3 days  -CB      Transfer Training PT STG, Activity Type bed to chair /chair to bed;sit to stand/stand to sit  -CB      Transfer Training PT STG, [place] Level contact guard assist  -CB      Transfer Training PT STG, Assist Device walker, rolling  -CB      Gait Training PT LTG    Gait Training Goal PT LTG, Date Established 12/20/17  -CB      Gait Training Goal PT LTG, Time to Achieve by discharge  -CB      Gait Training Goal PT LTG, [place] Level contact guard assist  -CB      Gait Training Goal PT LTG, Assist Device walker, rolling  -CB      Gait Training Goal PT LTG, Distance to Achieve 100 feet  -CB      Strength Goal PT LTG    Strength Goal PT LTG, Date Established 12/20/17  -CB      Strength Goal PT LTG, Time to Achieve by discharge  -CB      Strength Goal PT LTG, Measure to Achieve 20 reps all ex BLE activiely  -CB      Strength  Goal PT LTG, Functional Goal get legs up onto bed following hip precautions  -CB      Physical Therapy PT STG    Physical Therapy PT STG, Date Established 12/20/17  -CB      Physical Therapy PT STG, Time to Achieve 2 - 3 days  -CB      Physical Therapy PT STG, Activity Type be able to verbalize hip precautions and follow them  -CB      Physical Therapy PT STG, [place] Level independent;verbal cues required  -CB        User Key  (r) = Recorded By, (t) = Taken By, (c) = Cosigned By    Initials Name Provider Type    CB Gisselle Perales, PT Physical Therapist          Physical Therapy Education     Title: PT OT SLP Therapies (Active)     Topic: Physical Therapy (Active)     Point: Mobility training (Active)    Learning Progress Summary    Learner Readiness Method Response Comment Documented by Status   Patient Nonacceptance D NR,NL  CB 12/20/17 1257 Active               Point: Precautions (Active)    Learning Progress Summary    Learner Readiness Method Response Comment Documented by Status   Patient Nonacceptance D NR,NL  CB 12/20/17 1257 Active                      User Key     Initials Effective Dates Name Provider Type Discipline     04/06/17 -  Gisselle Perales, PT Physical Therapist PT                    PT Recommendation and Plan  Anticipated Equipment Needs At Discharge:  (facility will provide)  Anticipated Discharge Disposition: skilled nursing facility  Planned Therapy Interventions: bed mobility training, gait training, home exercise program, transfer training, strengthening, patient/family education  PT Frequency: per priority policy (5-14)             Outcome Measures       12/20/17 1335 12/20/17 0914       How much help from another person do you currently need...    Turning from your back to your side while in flat bed without using bedrails? 2  -AM 2  -CB     Moving from lying on back to sitting on the side of a flat bed without bedrails? 2  -AM 2  -CB     Moving to and from a bed to a chair  (including a wheelchair)? 2  -AM 1  -CB     Standing up from a chair using your arms (e.g., wheelchair, bedside chair)? 2  -AM 2  -CB     Climbing 3-5 steps with a railing? 1  -AM 1  -CB     To walk in hospital room? 1  -AM 1  -CB     AM-PAC 6 Clicks Score 10  -AM 9  -CB     Functional Assessment    Outcome Measure Options AM-PAC 6 Clicks Basic Mobility (PT)  -AM AM-PAC 6 Clicks Basic Mobility (PT)  -CB       User Key  (r) = Recorded By, (t) = Taken By, (c) = Cosigned By    Initials Name Provider Type    JOSEY Perales, PT Physical Therapist    AM Lawson Flaherty PTA Physical Therapy Assistant           Time Calculation:         PT Charges       12/20/17 1335 12/20/17 1306       Time Calculation    Start Time 1335  -AM 0914  -CB     Stop Time 1415  -AM 0958  -CB     Time Calculation (min) 40 min  -AM 44 min  -CB     PT Received On 12/20/17  -AM 12/20/17  -CB     PT - Next Appointment 12/21/17  -AM      PT Goal Re-Cert Due Date  01/02/18  -CB     Time Calculation- PT    Total Timed Code Minutes- PT 40 minute(s)  -AM 29 minute(s)  -CB       User Key  (r) = Recorded By, (t) = Taken By, (c) = Cosigned By    Initials Name Provider Type    JOSEY Perales, PT Physical Therapist    AM Lawson Flaherty PTA Physical Therapy Assistant          Therapy Charges for Today     Code Description Service Date Service Provider Modifiers Qty    [id number] HC PT THER PROC EA 15 MIN 12/20/2017 Lawson Flaherty PTA GP 3     Duration:  40m ( 1:35 PM -  2:15 PM)            PT G-Codes  PT Professional Judgement Used?: Yes  Outcome Measure Options: AM-PAC 6 Clicks Basic Mobility (PT)  Score: 9  Functional Limitation: Mobility: Walking and moving around  Mobility: Walking and Moving Around Current Status (): At least 60 percent but less than 80 percent impaired, limited or restricted  Mobility: Walking and Moving Around Goal Status (): At least 40 percent but less than 60 percent impaired, limited or restricted    Lawson LAM  Reynold, PTA  12/20/2017

## 2017-12-20 NOTE — ANESTHESIA POSTPROCEDURE EVALUATION
Patient: Nickolas Muhammad    Procedure Summary     Date Anesthesia Start Anesthesia Stop Room / Location    12/19/17 1649 6089  [facility] OR 12 / [facility]       Procedure Diagnosis Surgeon Provider    HIP HEMIARTHROPLASTY (Left Hip) Displaced fracture of base of neck of left femur, initial encounter for closed fracture  (Displaced fracture of base of neck of left femur, initial encounter for closed fracture [S72.042A]) MD Rory Moscoso MD          Anesthesia Type: general  Last vitals  BP   154/84 (12/19/17 1400)   Temp   99.7 °F (37.6 °C) (12/19/17 1400)   Pulse   86 (12/19/17 1400)   Resp   14 (12/19/17 1400)     SpO2   92 % (12/19/17 1400)     Post Anesthesia Care and Evaluation    Patient location during evaluation: PACU  Patient participation: complete - patient cannot participate  Level of consciousness: awake  Pain score: 0  Pain management: adequate  Airway patency: patent  Anesthetic complications: No anesthetic complications  PONV Status: none  Cardiovascular status: acceptable  Respiratory status: acceptable  Hydration status: acceptable

## 2017-12-21 LAB
ALBUMIN SERPL-MCNC: 3 G/DL (ref 3.4–4.8)
ALBUMIN/GLOB SERPL: 1.1 G/DL (ref 1.1–1.8)
ALP SERPL-CCNC: 53 U/L (ref 38–126)
ALT SERPL W P-5'-P-CCNC: 27 U/L (ref 21–72)
ANION GAP SERPL CALCULATED.3IONS-SCNC: 8 MMOL/L (ref 5–15)
AST SERPL-CCNC: 33 U/L (ref 17–59)
BASOPHILS # BLD AUTO: 0.01 10*3/MM3 (ref 0–0.2)
BASOPHILS NFR BLD AUTO: 0.1 % (ref 0–2)
BILIRUB CONJ SERPL-MCNC: 0 MG/DL (ref 0–0.3)
BILIRUB SERPL-MCNC: 0.6 MG/DL (ref 0.2–1.3)
BUN BLD-MCNC: 16 MG/DL (ref 7–21)
BUN/CREAT SERPL: 16 (ref 7–25)
CALCIUM SPEC-SCNC: 8.3 MG/DL (ref 8.4–10.2)
CHLORIDE SERPL-SCNC: 105 MMOL/L (ref 95–110)
CO2 SERPL-SCNC: 24 MMOL/L (ref 22–31)
CREAT BLD-MCNC: 1 MG/DL (ref 0.7–1.3)
DEPRECATED RDW RBC AUTO: 48.3 FL (ref 35.1–43.9)
EOSINOPHIL # BLD AUTO: 0.27 10*3/MM3 (ref 0–0.7)
EOSINOPHIL NFR BLD AUTO: 2.4 % (ref 0–7)
ERYTHROCYTE [DISTWIDTH] IN BLOOD BY AUTOMATED COUNT: 14.1 % (ref 11.5–14.5)
GFR SERPL CREATININE-BSD FRML MDRD: 73 ML/MIN/1.73 (ref 42–98)
GLOBULIN UR ELPH-MCNC: 2.8 GM/DL (ref 2.3–3.5)
GLUCOSE BLD-MCNC: 84 MG/DL (ref 60–100)
HCT VFR BLD AUTO: 27.7 % (ref 39–49)
HGB BLD-MCNC: 9 G/DL (ref 13.7–17.3)
IMM GRANULOCYTES # BLD: 0.03 10*3/MM3 (ref 0–0.02)
IMM GRANULOCYTES NFR BLD: 0.3 % (ref 0–0.5)
LAB AP CASE REPORT: NORMAL
LYMPHOCYTES # BLD AUTO: 1.01 10*3/MM3 (ref 0.6–4.2)
LYMPHOCYTES NFR BLD AUTO: 9.1 % (ref 10–50)
Lab: NORMAL
MCH RBC QN AUTO: 30.5 PG (ref 26.5–34)
MCHC RBC AUTO-ENTMCNC: 32.5 G/DL (ref 31.5–36.3)
MCV RBC AUTO: 93.9 FL (ref 80–98)
MONOCYTES # BLD AUTO: 1.31 10*3/MM3 (ref 0–0.9)
MONOCYTES NFR BLD AUTO: 11.8 % (ref 0–12)
NEUTROPHILS # BLD AUTO: 8.46 10*3/MM3 (ref 2–8.6)
NEUTROPHILS NFR BLD AUTO: 76.3 % (ref 37–80)
PATH REPORT.FINAL DX SPEC: NORMAL
PATH REPORT.GROSS SPEC: NORMAL
PLATELET # BLD AUTO: 159 10*3/MM3 (ref 150–450)
PMV BLD AUTO: 10.7 FL (ref 8–12)
POTASSIUM BLD-SCNC: 4.4 MMOL/L (ref 3.5–5.1)
PROT SERPL-MCNC: 5.8 G/DL (ref 6.3–8.6)
RBC # BLD AUTO: 2.95 10*6/MM3 (ref 4.37–5.74)
SODIUM BLD-SCNC: 137 MMOL/L (ref 137–145)
WBC NRBC COR # BLD: 11.09 10*3/MM3 (ref 3.2–9.8)

## 2017-12-21 PROCEDURE — 94799 UNLISTED PULMONARY SVC/PX: CPT

## 2017-12-21 PROCEDURE — 82248 BILIRUBIN DIRECT: CPT | Performed by: INTERNAL MEDICINE

## 2017-12-21 PROCEDURE — 97530 THERAPEUTIC ACTIVITIES: CPT

## 2017-12-21 PROCEDURE — 94640 AIRWAY INHALATION TREATMENT: CPT

## 2017-12-21 PROCEDURE — 25010000002 ENOXAPARIN PER 10 MG: Performed by: ORTHOPAEDIC SURGERY

## 2017-12-21 PROCEDURE — 80053 COMPREHEN METABOLIC PANEL: CPT | Performed by: INTERNAL MEDICINE

## 2017-12-21 PROCEDURE — 85025 COMPLETE CBC W/AUTO DIFF WBC: CPT | Performed by: INTERNAL MEDICINE

## 2017-12-21 PROCEDURE — 94760 N-INVAS EAR/PLS OXIMETRY 1: CPT

## 2017-12-21 PROCEDURE — 25810000003 SODIUM CHLORIDE 0.9 % WITH KCL 20 MEQ 20-0.9 MEQ/L-% SOLUTION: Performed by: ORTHOPAEDIC SURGERY

## 2017-12-21 PROCEDURE — 99221 1ST HOSP IP/OBS SF/LOW 40: CPT | Performed by: PSYCHIATRY & NEUROLOGY

## 2017-12-21 RX ORDER — ALBUTEROL SULFATE 2.5 MG/3ML
SOLUTION RESPIRATORY (INHALATION)
Status: DISCONTINUED | OUTPATIENT
Start: 2017-12-21 | End: 2017-12-22 | Stop reason: HOSPADM

## 2017-12-21 RX ADMIN — ATORVASTATIN CALCIUM 10 MG: 10 TABLET, FILM COATED ORAL at 08:04

## 2017-12-21 RX ADMIN — VITAMIN D, TAB 1000IU (100/BT) 5000 UNITS: 25 TAB at 08:04

## 2017-12-21 RX ADMIN — SERTRALINE HYDROCHLORIDE 100 MG: 50 TABLET ORAL at 17:53

## 2017-12-21 RX ADMIN — ALBUTEROL SULFATE 2.5 MG: 2.5 SOLUTION RESPIRATORY (INHALATION) at 14:05

## 2017-12-21 RX ADMIN — POLYETHYLENE GLYCOL 3350 17 G: 17 POWDER, FOR SOLUTION ORAL at 20:55

## 2017-12-21 RX ADMIN — TAMSULOSIN HYDROCHLORIDE 0.4 MG: 0.4 CAPSULE ORAL at 20:54

## 2017-12-21 RX ADMIN — ALBUTEROL SULFATE 2.5 MG: 2.5 SOLUTION RESPIRATORY (INHALATION) at 19:05

## 2017-12-21 RX ADMIN — POTASSIUM CHLORIDE AND SODIUM CHLORIDE 100 ML/HR: 900; 150 INJECTION, SOLUTION INTRAVENOUS at 08:49

## 2017-12-21 RX ADMIN — OLANZAPINE 5 MG: 5 TABLET, FILM COATED ORAL at 20:54

## 2017-12-21 RX ADMIN — ENOXAPARIN SODIUM 40 MG: 40 INJECTION SUBCUTANEOUS at 08:04

## 2017-12-21 RX ADMIN — DOCUSATE SODIUM 100 MG: 100 CAPSULE, LIQUID FILLED ORAL at 08:04

## 2017-12-21 RX ADMIN — APIXABAN 2.5 MG: 2.5 TABLET, FILM COATED ORAL at 13:03

## 2017-12-21 RX ADMIN — Medication 12.5 MG: at 08:04

## 2017-12-21 RX ADMIN — PANTOPRAZOLE SODIUM 40 MG: 40 TABLET, DELAYED RELEASE ORAL at 06:16

## 2017-12-21 RX ADMIN — POLYETHYLENE GLYCOL 3350 17 G: 17 POWDER, FOR SOLUTION ORAL at 08:04

## 2017-12-21 RX ADMIN — DOCUSATE SODIUM 100 MG: 100 CAPSULE, LIQUID FILLED ORAL at 20:54

## 2017-12-21 RX ADMIN — AMIODARONE HYDROCHLORIDE 200 MG: 200 TABLET ORAL at 08:04

## 2017-12-21 RX ADMIN — HYDROCODONE BITARTRATE AND ACETAMINOPHEN 1 TABLET: 7.5; 325 TABLET ORAL at 06:16

## 2017-12-21 RX ADMIN — HYDROCODONE BITARTRATE AND ACETAMINOPHEN 1 TABLET: 7.5; 325 TABLET ORAL at 10:28

## 2017-12-21 RX ADMIN — SERTRALINE HYDROCHLORIDE 100 MG: 50 TABLET ORAL at 08:04

## 2017-12-21 RX ADMIN — LEVOTHYROXINE SODIUM 25 MCG: 25 TABLET ORAL at 08:04

## 2017-12-21 RX ADMIN — APIXABAN 2.5 MG: 2.5 TABLET, FILM COATED ORAL at 20:54

## 2017-12-21 RX ADMIN — MIRTAZAPINE 30 MG: 15 TABLET, FILM COATED ORAL at 20:54

## 2017-12-21 NOTE — PLAN OF CARE
Problem: Patient Care Overview (Adult)  Goal: Plan of Care Review  Outcome: Ongoing (interventions implemented as appropriate)   12/21/17 0105   Coping/Psychosocial Response Interventions   Plan Of Care Reviewed With patient   Patient Care Overview   Progress improving   Outcome Evaluation   Outcome Summary/Follow up Plan Pt rested well most of night.     Goal: Adult Individualization and Mutuality  Outcome: Ongoing (interventions implemented as appropriate)    Goal: Discharge Needs Assessment  Outcome: Ongoing (interventions implemented as appropriate)      Problem: Fall Risk (Adult)  Goal: Absence of Falls  Outcome: Ongoing (interventions implemented as appropriate)

## 2017-12-21 NOTE — PLAN OF CARE
Problem: Patient Care Overview (Adult)  Goal: Adult Individualization and Mutuality  Outcome: Ongoing (interventions implemented as appropriate)    Goal: Discharge Needs Assessment  Outcome: Ongoing (interventions implemented as appropriate)      Problem: Fall Risk (Adult)  Goal: Absence of Falls  Outcome: Ongoing (interventions implemented as appropriate)

## 2017-12-21 NOTE — DISCHARGE SUMMARY
"Admission Date: 12/15/2017    Discharge Date: 12/19/2017    Psychiatric History: Patient is a 72 y.o. male who was hospitalized for dementia related behavioral issues.  He was referred from a nursing home because of depression and aggressive behavior.  Patient wants to talk to me about his  experience and notes that he was medevaced from [place] around 1974/75.  He also notes that he was honorably discharged from the  3 times but denies that he enrolled more than once.  He then asked about his watch and wanted to get his watch and refused to talk any further until I got him his watch.  He was polite about it.  He raised his hands and stated \"I plead the fifth\" in reference to refusing to answer any further questions.    Diagnostic Data:    Recent Results (from the past 168 hour(s))   Comprehensive Metabolic Panel    Collection Time: 12/15/17  7:27 PM   Result Value Ref Range    Glucose 85 60 - 100 mg/dL    BUN 13 7 - 21 mg/dL    Creatinine 1.08 0.70 - 1.30 mg/dL    Sodium 139 137 - 145 mmol/L    Potassium 4.0 3.5 - 5.1 mmol/L    Chloride 103 95 - 110 mmol/L    CO2 25.0 22.0 - 31.0 mmol/L    Calcium 9.1 8.4 - 10.2 mg/dL    Total Protein 7.4 6.3 - 8.6 g/dL    Albumin 4.20 3.40 - 4.80 g/dL    ALT (SGPT) 39 21 - 72 U/L    AST (SGOT) 46 17 - 59 U/L    Alkaline Phosphatase 76 38 - 126 U/L    Total Bilirubin 0.7 0.2 - 1.3 mg/dL    eGFR Non African Amer 67 42 - 98 mL/min/1.73    Globulin 3.2 2.3 - 3.5 gm/dL    A/G Ratio 1.3 1.1 - 1.8 g/dL    BUN/Creatinine Ratio 12.0 7.0 - 25.0    Anion Gap 11.0 5.0 - 15.0 mmol/L   Salicylate Level    Collection Time: 12/15/17  7:27 PM   Result Value Ref Range    Salicylate <1.0 (L) 10.0 - 20.0 mg/dL   Acetaminophen Level    Collection Time: 12/15/17  7:27 PM   Result Value Ref Range    Acetaminophen <10.0 (L) 10.0 - 30.0 mcg/mL   Ethanol    Collection Time: 12/15/17  7:27 PM   Result Value Ref Range    Ethanol <10 0 - 10 mg/dL    Ethanol % <0.010 %   TSH    Collection " Time: 12/15/17  7:27 PM   Result Value Ref Range    TSH 4.060 0.460 - 4.680 mIU/mL   Light Blue Top    Collection Time: 12/15/17  7:27 PM   Result Value Ref Range    Extra Tube hold for add-on    CBC Auto Differential    Collection Time: 12/15/17  7:28 PM   Result Value Ref Range    WBC 8.83 3.20 - 9.80 10*3/mm3    RBC 3.52 (L) 4.37 - 5.74 10*6/mm3    Hemoglobin 10.9 (L) 13.7 - 17.3 g/dL    Hematocrit 33.2 (L) 39.0 - 49.0 %    MCV 94.3 80.0 - 98.0 fL    MCH 31.0 26.5 - 34.0 pg    MCHC 32.8 31.5 - 36.3 g/dL    RDW 13.6 11.5 - 14.5 %    RDW-SD 47.3 (H) 35.1 - 43.9 fl    MPV 10.7 8.0 - 12.0 fL    Platelets 176 150 - 450 10*3/mm3    Neutrophil % 70.2 37.0 - 80.0 %    Lymphocyte % 15.4 10.0 - 50.0 %    Monocyte % 12.2 (H) 0.0 - 12.0 %    Eosinophil % 1.8 0.0 - 7.0 %    Basophil % 0.2 0.0 - 2.0 %    Immature Grans % 0.2 0.0 - 0.5 %    Neutrophils, Absolute 6.19 2.00 - 8.60 10*3/mm3    Lymphocytes, Absolute 1.36 0.60 - 4.20 10*3/mm3    Monocytes, Absolute 1.08 (H) 0.00 - 0.90 10*3/mm3    Eosinophils, Absolute 0.16 0.00 - 0.70 10*3/mm3    Basophils, Absolute 0.02 0.00 - 0.20 10*3/mm3    Immature Grans, Absolute 0.02 0.00 - 0.02 10*3/mm3   Urinalysis With / Culture If Indicated - Urine, Clean Catch    Collection Time: 12/15/17  7:54 PM   Result Value Ref Range    Color, UA Yellow Yellow, Straw, Dark Yellow, Skye    Appearance, UA Clear Clear    pH, UA 7.0 5.0 - 9.0    Specific Gravity, UA 1.006 1.003 - 1.030    Glucose, UA Negative Negative    Ketones, UA Negative Negative    Bilirubin, UA Negative Negative    Blood, UA Negative Negative    Protein, UA Negative Negative    Leuk Esterase, UA Negative Negative    Nitrite, UA Negative Negative    Urobilinogen, UA 0.2 E.U./dL 0.2 - 1.0 E.U./dL   Urine Drug Screen - Urine, Clean Catch    Collection Time: 12/15/17  7:54 PM   Result Value Ref Range    Amphetamine Screen, Urine Negative Negative    Barbiturates Screen, Urine Negative Negative    Benzodiazepine Screen, Urine  Negative Negative    Cocaine Screen, Urine Negative Negative    Methadone Screen, Urine Negative Negative    Opiate Screen Negative Negative    Oxycodone Screen, Urine Negative Negative    THC, Screen, Urine Negative Negative   Lipid Panel    Collection Time: 12/16/17  8:20 AM   Result Value Ref Range    Total Cholesterol 157 0 - 199 mg/dL    Triglycerides 64 20 - 199 mg/dL    HDL Cholesterol 54 (L) 60 - 200 mg/dL    LDL Cholesterol  77 1 - 129 mg/dL    LDL/HDL Ratio 1.67 0.00 - 3.55   Glucose, Fasting    Collection Time: 12/16/17  8:20 AM   Result Value Ref Range    Glucose, Fasting 82 60 - 110 mg/dL   CBC (No Diff)    Collection Time: 12/19/17  7:07 AM   Result Value Ref Range    WBC 9.22 3.20 - 9.80 10*3/mm3    RBC 3.34 (L) 4.37 - 5.74 10*6/mm3    Hemoglobin 10.5 (L) 13.7 - 17.3 g/dL    Hematocrit 31.5 (L) 39.0 - 49.0 %    MCV 94.3 80.0 - 98.0 fL    MCH 31.4 26.5 - 34.0 pg    MCHC 33.3 31.5 - 36.3 g/dL    RDW 13.7 11.5 - 14.5 %    RDW-SD 47.0 (H) 35.1 - 43.9 fl    MPV 11.0 8.0 - 12.0 fL    Platelets 164 150 - 450 10*3/mm3   Comprehensive Metabolic Panel    Collection Time: 12/19/17  7:07 AM   Result Value Ref Range    Glucose 92 60 - 100 mg/dL    BUN 20 7 - 21 mg/dL    Creatinine 1.14 0.70 - 1.30 mg/dL    Sodium 141 137 - 145 mmol/L    Potassium 4.1 3.5 - 5.1 mmol/L    Chloride 103 95 - 110 mmol/L    CO2 25.0 22.0 - 31.0 mmol/L    Calcium 8.8 8.4 - 10.2 mg/dL    Total Protein 7.1 6.3 - 8.6 g/dL    Albumin 3.90 3.40 - 4.80 g/dL    ALT (SGPT) 31 21 - 72 U/L    AST (SGOT) 49 17 - 59 U/L    Alkaline Phosphatase 77 38 - 126 U/L    Total Bilirubin 0.6 0.2 - 1.3 mg/dL    eGFR Non African Amer 63 >60 mL/min/1.73    Globulin 3.2 2.3 - 3.5 gm/dL    A/G Ratio 1.2 1.1 - 1.8 g/dL    BUN/Creatinine Ratio 17.5 7.0 - 25.0    Anion Gap 13.0 5.0 - 15.0 mmol/L   Protime-INR    Collection Time: 12/19/17  7:07 AM   Result Value Ref Range    Protime 14.0 11.1 - 15.3 Seconds    INR 1.09 0.80 - 1.20   Type & Screen    Collection Time:  12/19/17  6:17 PM   Result Value Ref Range    ABO Type O     RH type Positive     Antibody Screen Negative    PREVIOUS HISTORY    Collection Time: 12/19/17  6:17 PM   Result Value Ref Range    Previous History Patient needs ABO/RH on day of surgery.    Tissue Pathology Exam - Bone, Hip, Left    Collection Time: 12/19/17  6:23 PM   Result Value Ref Range    Case Report       Surgical Pathology Report                         Case: [id number]                                  Authorizing Provider:  Javan Talley,  Collected:           12/19/2017 06:23 PM                                 MD                                                                           Ordering Location:     [facility]             Received:            12/20/2017 07:56 AM                                 [facility]                                                              Pathologist:           Derick Massey MD                                                            Specimen:    Hip, Left, LEFT FEMORAL HEAD                                                               Final Diagnosis       LEFT HIP, REPLACEMENT:  OSTEOPOROSIS.  FEMORAL NECK FRACTURE, SECONDARY TO ABOVE.      Gross Description       The femoral head measures 5.5 cm in diameter and 4.5 cm in height.  The base is irregular and hemorrhagic, consistent with fracture.  Sectioning reveals no focal lesions.  Also present in the container are additional fragments of bone from the femoral neck measuring 6.0 x 6.0 x 2.5 cm in aggregate.  Sections are submitted for decalcification.      Embedded Images     Comprehensive Metabolic Panel    Collection Time: 12/20/17  7:48 AM   Result Value Ref Range    Glucose 85 60 - 100 mg/dL    BUN 16 7 - 21 mg/dL    Creatinine 0.94 0.70 - 1.30 mg/dL    Sodium 136 (L) 137 - 145 mmol/L    Potassium 4.6 3.5 - 5.1 mmol/L    Chloride 99 95 - 110 mmol/L    CO2 24.0 22.0 - 31.0 mmol/L    Calcium 8.3 (L) 8.4 - 10.2 mg/dL    Total Protein  6.3 6.3 - 8.6 g/dL    Albumin 3.40 3.40 - 4.80 g/dL    ALT (SGPT) 23 21 - 72 U/L    AST (SGOT) 36 17 - 59 U/L    Alkaline Phosphatase 61 38 - 126 U/L    Total Bilirubin 0.6 0.2 - 1.3 mg/dL    eGFR Non African Amer 79 >60 mL/min/1.73    Globulin 2.9 2.3 - 3.5 gm/dL    A/G Ratio 1.2 1.1 - 1.8 g/dL    BUN/Creatinine Ratio 17.0 7.0 - 25.0    Anion Gap 13.0 5.0 - 15.0 mmol/L   CBC Auto Differential    Collection Time: 12/20/17  7:48 AM   Result Value Ref Range    WBC 10.77 (H) 3.20 - 9.80 10*3/mm3    RBC 3.26 (L) 4.37 - 5.74 10*6/mm3    Hemoglobin 10.0 (L) 13.7 - 17.3 g/dL    Hematocrit 30.7 (L) 39.0 - 49.0 %    MCV 94.2 80.0 - 98.0 fL    MCH 30.7 26.5 - 34.0 pg    MCHC 32.6 31.5 - 36.3 g/dL    RDW 13.7 11.5 - 14.5 %    RDW-SD 46.9 (H) 35.1 - 43.9 fl    MPV 10.8 8.0 - 12.0 fL    Platelets 154 150 - 450 10*3/mm3    Neutrophil % 82.0 (H) 37.0 - 80.0 %    Lymphocyte % 7.3 (L) 10.0 - 50.0 %    Monocyte % 10.0 0.0 - 12.0 %    Eosinophil % 0.3 0.0 - 7.0 %    Basophil % 0.1 0.0 - 2.0 %    Immature Grans % 0.3 0.0 - 0.5 %    Neutrophils, Absolute 8.83 (H) 2.00 - 8.60 10*3/mm3    Lymphocytes, Absolute 0.79 0.60 - 4.20 10*3/mm3    Monocytes, Absolute 1.08 (H) 0.00 - 0.90 10*3/mm3    Eosinophils, Absolute 0.03 0.00 - 0.70 10*3/mm3    Basophils, Absolute 0.01 0.00 - 0.20 10*3/mm3    Immature Grans, Absolute 0.03 (H) 0.00 - 0.02 10*3/mm3   Bilirubin, Direct    Collection Time: 12/20/17  7:48 AM   Result Value Ref Range    Bilirubin, Direct 0.0 0.0 - 0.3 mg/dL   Blood Culture - Blood,    Collection Time: 12/20/17  1:44 PM   Result Value Ref Range    Blood Culture No growth at 24 hours    Blood Culture - Blood,    Collection Time: 12/20/17  1:44 PM   Result Value Ref Range    Blood Culture No growth at 24 hours    Comprehensive Metabolic Panel    Collection Time: 12/21/17  6:01 AM   Result Value Ref Range    Glucose 84 60 - 100 mg/dL    BUN 16 7 - 21 mg/dL    Creatinine 1.00 0.70 - 1.30 mg/dL    Sodium 137 137 - 145 mmol/L    Potassium  4.4 3.5 - 5.1 mmol/L    Chloride 105 95 - 110 mmol/L    CO2 24.0 22.0 - 31.0 mmol/L    Calcium 8.3 (L) 8.4 - 10.2 mg/dL    Total Protein 5.8 (L) 6.3 - 8.6 g/dL    Albumin 3.00 (L) 3.40 - 4.80 g/dL    ALT (SGPT) 27 21 - 72 U/L    AST (SGOT) 33 17 - 59 U/L    Alkaline Phosphatase 53 38 - 126 U/L    Total Bilirubin 0.6 0.2 - 1.3 mg/dL    eGFR Non African Amer 73 42 - 98 mL/min/1.73    Globulin 2.8 2.3 - 3.5 gm/dL    A/G Ratio 1.1 1.1 - 1.8 g/dL    BUN/Creatinine Ratio 16.0 7.0 - 25.0    Anion Gap 8.0 5.0 - 15.0 mmol/L   CBC Auto Differential    Collection Time: 12/21/17  6:01 AM   Result Value Ref Range    WBC 11.09 (H) 3.20 - 9.80 10*3/mm3    RBC 2.95 (L) 4.37 - 5.74 10*6/mm3    Hemoglobin 9.0 (L) 13.7 - 17.3 g/dL    Hematocrit 27.7 (L) 39.0 - 49.0 %    MCV 93.9 80.0 - 98.0 fL    MCH 30.5 26.5 - 34.0 pg    MCHC 32.5 31.5 - 36.3 g/dL    RDW 14.1 11.5 - 14.5 %    RDW-SD 48.3 (H) 35.1 - 43.9 fl    MPV 10.7 8.0 - 12.0 fL    Platelets 159 150 - 450 10*3/mm3    Neutrophil % 76.3 37.0 - 80.0 %    Lymphocyte % 9.1 (L) 10.0 - 50.0 %    Monocyte % 11.8 0.0 - 12.0 %    Eosinophil % 2.4 0.0 - 7.0 %    Basophil % 0.1 0.0 - 2.0 %    Immature Grans % 0.3 0.0 - 0.5 %    Neutrophils, Absolute 8.46 2.00 - 8.60 10*3/mm3    Lymphocytes, Absolute 1.01 0.60 - 4.20 10*3/mm3    Monocytes, Absolute 1.31 (H) 0.00 - 0.90 10*3/mm3    Eosinophils, Absolute 0.27 0.00 - 0.70 10*3/mm3    Basophils, Absolute 0.01 0.00 - 0.20 10*3/mm3    Immature Grans, Absolute 0.03 (H) 0.00 - 0.02 10*3/mm3   Bilirubin, Direct    Collection Time: 12/21/17  6:01 AM   Result Value Ref Range    Bilirubin, Direct 0.0 0.0 - 0.3 mg/dL     Xr Chest 1 View    Result Date: 12/15/2017  Narrative: PORTABLE CHEST HISTORY: COPD. Portable AP upright film of the chest was obtained at 7:23 PM. COMPARISON: None Chronic obstructive pulmonary disease. Linear atelectasis or scar left midlung. No focal infiltrate. Postoperative changes in the sternum and mediastinum. Right-sided  pacemaker with leads projected over the right atrium and right ventricle. The heart is not enlarged. The pulmonary vasculature is not increased. No pleural effusion. No pneumothorax. No acute osseous abnormality.     Impression: CONCLUSION: Chronic obstructive pulmonary disease. Linear atelectasis or scar left midlung. No focal infiltrate. Coronary artery bypass. Right-sided pacemaker. [id number] Electronically signed by:  Bj Armijo MD  12/15/2017 8:10 PM CST Workstation: [email or web address]    Xr Hip With Or Without Pelvis 1 View Left    Result Date: 12/19/2017  Narrative: Left hip single view on 12/19/2017 CLINICAL INDICATION: Status post total hip arthroplasty COMPARISON: 12/18/2017 FINDINGS: The patient is status post interval left total hip arthroplasty. Skin staples are noted in place. Expected air and edema is noted in the soft tissues. No hardware complication is noted on limited single view exam. No fracture is noted. There is no dislocation.     Impression: No acute abnormality noted on limited single view exam. Electronically signed by:  Arnold Abbott  12/19/2017 10:22 PM CST Workstation: [id number]    Xr Hip With Or Without Pelvis 1 View Left    Result Date: 12/19/2017  Narrative: Pelvis and left hip total three view on 12/18/2017 CLINICAL INDICATION: Left hip pain after fall COMPARISON: None FINDINGS: There is an acute, oblique, displaced left subcapital femoral neck fracture. There is lateral and proximal displacement and varus angulation of the distal fracture fragment. The hips are well located. No other fracture is noted. The SI joints are well aligned.     Impression: Acute left subcapital femoral neck fracture. Electronically signed by:  Arnold Abbott  12/19/2017 12:32 AM CST Workstation: [id number]      Summary of Hospital Course:  Patient was admitted to the behavioral health unit at [facility] to ensure patient safety.  Patient was provided treatment with the unit  milieu, activities, therapies and psychopharmacological management.  Patient was placed on Q15 minute checks and Fall.  Dr. Johns was consulted for management of medical co-morbidities.  Patient was restarted on the following psychiatric medications: zyprexa 5mg qhs, zoloft was restarted but at 50mg daily instead of 100mg bid and remeron 30mg qhs.  The following medication changes were made during the hospital stay: He was started on depakote 125mg bid.  He had a fall while trying to walk behind his wheelchair.  The fall resulted in a hip fracture and he was discharged to medical.    Patients Condition at Discharge:  Mentally stable but transferred to medical due to hip fracture.    Discharge Diagnosis:  Active Problems:    Depression    Dementia with behavioral disturbance      Discharge Medications:      Your medication list       As of 12/19/2017  1:14 AM      ASK your doctor about these medications       Instructions Last Dose Given Next Dose Due    albuterol 108 (90 Base) MCG/ACT inhaler   Commonly known as:  PROVENTIL HFA;VENTOLIN HFA              amiodarone 200 MG tablet   Commonly known as:  PACERONE              calcium carbonate 648 MG tablet tablet              fish oil 1000 MG capsule capsule              glucosamine-chondroitin 500-400 MG capsule capsule              levothyroxine 25 MCG tablet   Commonly known as:  SYNTHROID, LEVOTHROID              metoprolol succinate XL 25 MG 24 hr tablet   Commonly known as:  TOPROL-XL              mirtazapine 30 MG tablet   Commonly known as:  REMERON              multivitamin with minerals tablet tablet              OLANZapine 5 MG tablet   Commonly known as:  zyPREXA              omeprazole 20 MG capsule   Commonly known as:  priLOSEC              polyethylene glycol packet   Commonly known as:  MIRALAX              pravastatin 20 MG tablet   Commonly known as:  PRAVACHOL              senna 8.6 MG tablet tablet   Commonly known as:  SENOKOT               sertraline 100 MG tablet   Commonly known as:  ZOLOFT              tamsulosin 0.4 MG capsule 24 hr capsule   Commonly known as:  FLOMAX              vitamin D3 5000 units capsule capsule                    Justification for multiple antipsychotic medications at discharge:  Not Applicable.    Medication for smoking cessation: Patient does not smoke and medication is not indicated.    Medication for substance abuse: Patient does not have a substance use diagnosis and medication is not indicated.    Disposition: Patient was discharged to the medical floor to address the hip fracture.    Time Spent: Less than 30 minutes.    Katlyn Leslie MD  12/21/17  3:59 PM
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
## 2017-12-21 NOTE — PROGRESS NOTES
No c/o  Vitals:    12/21/17 0324   BP: 128/66   Pulse: 87   Resp: 18   Temp: 97.6 °F (36.4 °C)   SpO2: 94%   Hb 9 Hct 27.7  Alert, comfortable  Dressing intact   knee immobilizer in place left leg  Dorsiflexion and plantarflexion intact left left  Sensation intact to touch  No signs dvt/pe  Pulse present left leg  Weight bearing as tolerated, left leg, posterior hip precautions, wear knee immobilizer for non compliance.  Physical therapy  dvt prophylaxis

## 2017-12-21 NOTE — PROGRESS NOTES
Progress Note  Dimitri Goldman MD  Hospitalist     LOS: 2 days   Patient Care Team:  Ilana Hess MD as PCP - General (Internal Medicine)    Chief Complaint: L hip pain    Subjective     Interval History:     Patient Complaints: L hip pain / improved after surgery.    History taken from: patient    Medication Review:   Current Facility-Administered Medications   Medication Dose Route Frequency Provider Last Rate Last Dose   • albuterol (PROVENTIL HFA;VENTOLIN HFA) inhaler 2 puff  2 puff Inhalation 4x Daily Dimitri Goldman MD       • albuterol (PROVENTIL) nebulizer solution 0.083% 2.5 mg/3mL  2.5 mg Nebulization Q4H PRN Tamera Cash MD       • amiodarone (PACERONE) tablet 200 mg  200 mg Oral Daily Tamera Cash MD   200 mg at 12/21/17 0804   • apixaban (ELIQUIS) tablet 2.5 mg  2.5 mg Oral Q12H Dimitri Goldman MD       • atorvastatin (LIPITOR) tablet 10 mg  10 mg Oral Daily Tamera Cash MD   10 mg at 12/21/17 0804   • calcium carbonate (TUMS) chewable tablet 500 mg (200 mg elemental)  1 tablet Oral Daily PRN Tamera Cash MD       • ceFAZolin (ANCEF) in SWFI 2 g/20ml IV PUSH syringe  2 g Intravenous Once Javan Talley MD       • cholecalciferol (VITAMIN D3) tablet 5,000 Units  5,000 Units Oral Daily Tamera Cash MD   5,000 Units at 12/21/17 0804   • docusate sodium (COLACE) capsule 100 mg  100 mg Oral BID Javan Talley MD   100 mg at 12/21/17 0804   • HYDROcodone-acetaminophen (NORCO) 7.5-325 MG per tablet 1 tablet  1 tablet Oral Q6H PRN Javan Talley MD   1 tablet at 12/21/17 1028   • HYDROmorphone (DILAUDID) injection 1 mg  1 mg Intravenous Q2H PRN Javan Talley MD       • levothyroxine (SYNTHROID, LEVOTHROID) tablet 25 mcg  25 mcg Oral Daily Tamera Cash MD   25 mcg at 12/21/17 0804   • metoprolol succinate XL (TOPROL-XL) 24 hr half tablet 12.5 mg  12.5 mg Oral Daily Tamera Cash MD   12.5 mg at 12/21/17 0804   • mirtazapine (REMERON) tablet  30 mg  30 mg Oral Nightly Tamera Cash MD   30 mg at 12/20/17 2059   • morphine injection 2 mg  2 mg Intravenous Q3H PRN Tamera Cash MD   2 mg at 12/19/17 1356   • OLANZapine (zyPREXA) tablet 5 mg  5 mg Oral Nightly Tamera Cash MD   5 mg at 12/20/17 2059   • pantoprazole (PROTONIX) EC tablet 40 mg  40 mg Oral QAM Tamera Cash MD   40 mg at 12/21/17 0616   • polyethylene glycol (MIRALAX) powder 17 g  17 g Oral BID Tamera Cash MD   17 g at 12/21/17 0804   • promethazine (PHENERGAN) injection 12.5 mg  12.5 mg Intravenous Q6H PRN Javan Talley MD       • sertraline (ZOLOFT) tablet 100 mg  100 mg Oral BID Tamera Cash MD   100 mg at 12/21/17 0804   • sodium chloride 1,000 mL with bacitracin 50,000 Units irrigation    PRN Javan Talley MD   10 mL at 12/19/17 1724   • tamsulosin (FLOMAX) 24 hr capsule 0.4 mg  0.4 mg Oral Nightly Tamera Cash MD   0.4 mg at 12/20/17 2059       Review of Systems:   Review of Systems   Constitutional: Positive for fatigue. Negative for fever.   Respiratory: Negative for cough and wheezing.    Gastrointestinal: Negative for abdominal distention, abdominal pain, anal bleeding, nausea and vomiting.   Genitourinary: Negative for dysuria, frequency and urgency.   Musculoskeletal: Positive for arthralgias and back pain. Negative for joint swelling.   Skin: Positive for pallor.   Neurological: Positive for weakness and light-headedness. Negative for seizures and headaches.   Psychiatric/Behavioral: Positive for behavioral problems and confusion. Negative for agitation.   All other systems reviewed and are negative.      Objective     Vital Signs  Temp:  [95.3 °F (35.2 °C)-101.4 °F (38.6 °C)] 98.6 °F (37 °C)  Heart Rate:  [86-94] 94  Resp:  [18] 18  BP: (111-150)/(52-82) 150/68    Physical Exam:  Physical Exam   Constitutional: He appears well-developed and well-nourished. No distress.   HENT:   Head: Atraumatic.   Eyes: EOM are normal. Pupils  are equal, round, and reactive to light. No scleral icterus.   Neck: Normal range of motion. Neck supple.   Cardiovascular: Normal rate and regular rhythm.    Pulmonary/Chest: Effort normal and breath sounds normal. No respiratory distress. He has no wheezes.   Abdominal: Soft. Bowel sounds are normal. He exhibits no distension. There is no tenderness.   Musculoskeletal: He exhibits tenderness (L hip). He exhibits no edema.   Neurological: He is alert. No cranial nerve deficit. Coordination abnormal.   Skin: Skin is warm and dry. No rash noted.   Psychiatric:   Confused    Vitals reviewed.       Results Review:    Lab Results (last 24 hours)     Procedure Component Value Units Date/Time    Blood Culture - Blood, [[id number]]  (Normal) Collected:  12/20/17 1344    Specimen:  Blood from Hand, Left Updated:  12/21/17 0203     Blood Culture No growth at less than 24 hours    Blood Culture - Blood, [[id number]]  (Normal) Collected:  12/20/17 1344    Specimen:  Blood from Arm, Left Updated:  12/21/17 0203     Blood Culture No growth at less than 24 hours    CBC & Differential [[id number]] Collected:  12/21/17 0601    Specimen:  Blood Updated:  12/21/17 0619    Narrative:       The following orders were created for panel order CBC & Differential.  Procedure                               Abnormality         Status                     ---------                               -----------         ------                     CBC Auto Differential[[id number]]        Abnormal            Final result                 Please view results for these tests on the individual orders.    CBC Auto Differential [[id number]]  (Abnormal) Collected:  12/21/17 0601    Specimen:  Blood Updated:  12/21/17 0619     WBC 11.09 (H) 10*3/mm3      RBC 2.95 (L) 10*6/mm3      Hemoglobin 9.0 (L) g/dL      Hematocrit 27.7 (L) %      MCV 93.9 fL      MCH 30.5 pg      MCHC 32.5 g/dL      RDW 14.1 %      RDW-SD 48.3 (H) fl      MPV 10.7 fL      Platelets 159  10*3/mm3      Neutrophil % 76.3 %      Lymphocyte % 9.1 (L) %      Monocyte % 11.8 %      Eosinophil % 2.4 %      Basophil % 0.1 %      Immature Grans % 0.3 %      Neutrophils, Absolute 8.46 10*3/mm3      Lymphocytes, Absolute 1.01 10*3/mm3      Monocytes, Absolute 1.31 (H) 10*3/mm3      Eosinophils, Absolute 0.27 10*3/mm3      Basophils, Absolute 0.01 10*3/mm3      Immature Grans, Absolute 0.03 (H) 10*3/mm3     Comprehensive Metabolic Panel [[id number]]  (Abnormal) Collected:  12/21/17 0601    Specimen:  Blood Updated:  12/21/17 0642     Glucose 84 mg/dL      BUN 16 mg/dL      Creatinine 1.00 mg/dL      Sodium 137 mmol/L      Potassium 4.4 mmol/L      Chloride 105 mmol/L      CO2 24.0 mmol/L      Calcium 8.3 (L) mg/dL      Total Protein 5.8 (L) g/dL      Albumin 3.00 (L) g/dL      ALT (SGPT) 27 U/L      AST (SGOT) 33 U/L      Alkaline Phosphatase 53 U/L      Total Bilirubin 0.6 mg/dL      eGFR Non African Amer 73 mL/min/1.73      Globulin 2.8 gm/dL      A/G Ratio 1.1 g/dL      BUN/Creatinine Ratio 16.0     Anion Gap 8.0 mmol/L     Narrative:       The MDRD GFR formula is only valid for adults with stable renal function between ages 18 and 70.    Bilirubin, Direct [[id number]]  (Normal) Collected:  12/21/17 0601    Specimen:  Blood Updated:  12/21/17 0642     Bilirubin, Direct 0.0 mg/dL     Tissue Pathology Exam - Bone, Hip, Left [[id number]] Collected:  12/19/17 1823    Specimen:  Bone from Hip, Left Updated:  12/21/17 0949     Case Report --     Surgical Pathology Report                         Case: [id number]                                  Authorizing Provider:  Javan Talley,  Collected:           12/19/2017 06:23 PM                                 MD                                                                           Ordering Location:     [facility]             Received:            12/20/2017 07:56 AM                                 [place] [facility]                                                               Pathologist:           Derick Massey MD                                                            Specimen:    Hip, Left, LEFT FEMORAL HEAD                                                                Final Diagnosis --     LEFT HIP, REPLACEMENT:  OSTEOPOROSIS.  FEMORAL NECK FRACTURE, SECONDARY TO ABOVE.       Gross Description --     The femoral head measures 5.5 cm in diameter and 4.5 cm in height.  The base is irregular and hemorrhagic, consistent with fracture.  Sectioning reveals no focal lesions.  Also present in the container are additional fragments of bone from the femoral neck measuring 6.0 x 6.0 x 2.5 cm in aggregate.  Sections are submitted for decalcification.       Embedded Images --          Imaging Results (last 24 hours)     ** No results found for the last 24 hours. **          Assessment/Plan     Principal Problem:    Displaced fracture of base of neck of left femur, initial encounter for closed fracture  Active Problems:    Dementia with behavioral disturbance    Hypertension    Fever    Atrial fibrillation    Seem improved. Try PT / OT. Continue with the current management.    Dimitri Goldman MD  12/21/17  11:05 AM

## 2017-12-21 NOTE — PLAN OF CARE
Problem: Inpatient Physical Therapy  Goal: Bed Mobility Goal STG- PT  Outcome: Ongoing (interventions implemented as appropriate)   12/20/17 0914   Bed Mobility PT STG   Bed Mobility PT STG, Date Established 12/20/17   Bed Mobility PT STG, Time to Achieve 2 - 3 days   Bed Mobility PT STG, Activity Type supine to sit/sit to supine   Bed Mobility PT STG, [place] Level contact guard assist   Transfer Training Goal, Assist Device bed rails   Bed Mobility PT STG, Additional Goal HOB up and follwoing hip precautions

## 2017-12-21 NOTE — THERAPY TREATMENT NOTE
Acute Care - Physical Therapy Treatment Note  [facility]     Patient Name: Nickolas Muhammad  : 1945  MRN: [id number]  Today's Date: 2017  Onset of Illness/Injury or Date of Surgery Date: 17  Date of Referral to PT: 17  Referring Physician: Dr Talley    Admit Date: 2017    Visit Dx:    ICD-10-CM ICD-9-CM   1. Displaced fracture of base of neck of left femur, initial encounter for closed fracture S72.042A 820.03   2. Impaired physical mobility Z74.09 781.99     Patient Active Problem List   Diagnosis   • Depression   • Dementia with behavioral disturbance   • Displaced fracture of base of neck of left femur, initial encounter for closed fracture   • Atrial fibrillation   • History of subdural hematoma   • H/O unilateral nephrectomy   • Hypertension   • Fever               Adult Rehabilitation Note       17 1410 17 1009 17 1335    Rehab Assessment/Intervention    Discipline physical therapy assistant  -TA physical therapy assistant  -TA physical therapy assistant  -AM    Document Type therapy note (daily note)  -TA therapy note (daily note)  -TA therapy note (daily note)  -AM    Subjective Information agree to therapy  -TA agree to therapy  -TA agree to therapy;no complaints  -AM    Patient Effort, Rehab Treatment adequate  -TA adequate  -TA adequate  -AM    Symptoms Noted During/After Treatment  dizziness  -TA none  -AM    Precautions/Limitations fall precautions;hip precautions- left  -TA fall precautions;hip precautions- left;other (see comments)   brace on when up, WBAT  -TA brace on when up;fall precautions;hip precautions- left;other (see comments)   WBAT LLE  -AM    Specific Treatment Considerations pt c/o pain of L arm, nurse informed  -TA      Equipment Issued to Patient   gait belt  -AM    Recorded by [TA] Barby Rodriguez PTA [TA] Barby Rodriguez PTA [AM] Lawson Flaherty PTA    Vital Signs    Pre Systolic BP Rehab 147  -  -  -AM    Pre  Treatment Diastolic BP 56  -TA 65  -TA 56  -AM    Post Systolic BP Rehab 128  -  -  -AM    Post Treatment Diastolic BP 63  -TA 59  -TA 59  -AM    Pretreatment Heart Rate (beats/min) 90  -TA 89  -TA 95  -AM    Posttreatment Heart Rate (beats/min) 87  -TA 83  -TA 86  -AM    Pre SpO2 (%) 94  -TA 92  -TA 93  -AM    O2 Delivery Pre Treatment room air  -TA room air  -TA room air  -AM    Post SpO2 (%) 92  -TA 92  -TA 92  -AM    O2 Delivery Post Treatment   room air  -AM    Pre Patient Position Supine  -TA Supine  -TA Supine  -AM    Intra Patient Position   Standing  -AM    Post Patient Position Supine  -TA Supine  -TA Supine  -AM    Recorded by [TA] Barby Rodriguez PTA [TA] Barby Rodriguez, PTA [AM] Lawson Flaherty PTA    Pain Assessment    Pain Assessment 0-10  -TA 0-10  -TA Unable to assess  -AM    Pain Score 5  -TA unable to assess   pt states a little  -TA     Post Pain Score 5  -TA 5  -TA     Pain Type Acute pain  -TA      Pain Location Leg  -TA      Pain Orientation Left  -TA      Recorded by [TA] Barby Rodriguez PTA [TA] Barby Rodriguez, PTA [AM] Lawson Flaherty PTA    Pain 2    Pain Score 2 5  -TA      Pre Tx Pain Score 2 5  -TA      Post Tx Pain Score 2 5  -TA      Pain Location 2 Arm  -TA      Pain Orientation 2 Left  -TA      Recorded by [TA] Barby Rodriguez PTA      Cognitive Assessment/Intervention    Current Cognitive/Communication Assessment functional  -TA functional  -TA functional  -AM    Orientation Status oriented x 4  -TA oriented x 4  -TA oriented x 4  -AM    Follows Commands/Answers Questions 100% of the time  -TA able to follow single-step instructions  -TA able to follow single-step instructions;needs cueing;needs increased time;needs repetition  -AM    Personal Safety mild impairment  -TA mild impairment  -TA     Personal Safety Interventions gait belt;nonskid shoes/slippers when out of bed  -TA gait belt;nonskid shoes/slippers when out of bed  -TA gait belt;nonskid shoes/slippers when  out of bed;supervised activity  -AM    Recorded by [TA] Barby Rodriguez PTA [TA] Barby Rodriguez PTA [AM] Lawson Flaherty PTA    ROM (Range of Motion)    General ROM   lower extremity range of motion deficits identified  -AM    Recorded by   [AM] Lawson Flaherty PTA    General LE Assessment    ROM   LLE ROM was WFL  -AM    Recorded by   [AM] Lawson Flaherty PTA    Mobility Assessment/Training    Extremity Weight-Bearing Status left lower extremity  -TA left lower extremity  -TA left lower extremity  -AM    Left Lower Extremity Weight-Bearing weight-bearing as tolerated  -TA weight-bearing as tolerated  -TA weight-bearing as tolerated  -AM    Recorded by [TA] Barby Rodriguez PTA [TA] Barby Rodriguez PTA [AM] Lawson Flaherty PTA    Bed Mobility, Assessment/Treatment    Bed Mobility, Assistive Device bed rails;head of bed elevated  -TA bed rails;head of bed elevated  -TA bed rails;head of bed elevated  -AM    Bed Mobility, Roll Left, [place]   not tested  -AM    Bed Mobility, Roll Right, [place]   not tested  -AM    Bed Mobility, Scoot/Bridge, [place] maximum assist (25% patient effort)  -TA maximum assist (25% patient effort)   with drawsheet  -TA not tested  -AM    Bed Mob, Supine to Sit, [place] maximum assist (25% patient effort);verbal cues required  -TA maximum assist (25% patient effort);verbal cues required  -TA dependent (less than 25% patient effort)  -AM    Bed Mob, Sit to Supine, [place] maximum assist (25% patient effort);verbal cues required  -TA maximum assist (25% patient effort);verbal cues required  -TA dependent (less than 25% patient effort)  -AM    Bed Mob, Sidelying to Sit, [place]   not tested  -AM    Bed Mob, Sit to Sidelying, [place]   not tested  -AM    Bed Mobility, Safety Issues   cognitive deficits limit understanding;decreased use of arms for pushing/pulling;decreased use of legs for bridging/pushing  -AM    Bed Mobility, Impairments   ROM  decreased;strength decreased  -AM    Recorded by [TA] Barby Rodriguez PTA [TA] Barby Rodriguez PTA [AM] Lawson Flaherty PTA    Transfer Assessment/Treatment    Transfers, Bed-Chair [place]   dependent (less than 25% patient effort);2 person assist required  -AM    Transfers, Chair-Bed [place]   dependent (less than 25% patient effort);2 person assist required  -AM    Transfers, Bed-Chair-Bed, Assist Device   other (see comments)   HHA  -AM    Transfers, Sit-Stand [place]  moderate assist (50% patient effort)  -TA dependent (less than 25% patient effort);2 person assist required  -AM    Transfers, Stand-Sit [place]  moderate assist (50% patient effort)  -TA dependent (less than 25% patient effort);2 person assist required  -AM    Transfers, Sit-Stand-Sit, Assist Device  rolling walker  -TA other (see comments)   HHA  -AM    Toilet Transfer, [place]   not tested  -AM    Walk-In Shower Transfer, [place]   not tested  -AM    Bathtub Transfer, [place]   not tested  -AM    Transfer, Maintain Weight Bearing Status  able to maintain weight bearing status  -TA able to maintain weight bearing status  -AM    Transfer, Safety Issues  step length decreased  -TA step length decreased  -AM    Transfer, Impairments  strength decreased  -TA ROM decreased;strength decreased  -AM    Transfer, Comment  pt stood x 2, pt unable to take steps  -TA     Recorded by  [TA] Barby Rodriguez PTA [AM] Lawson Flaherty PTA    Gait Assessment/Treatment    Gait, [place] Level   not tested  -AM    Gait, Assistive Device   --  -AM    Gait, Impairments   --  -AM    Recorded by   [AM] Lawson Flaherty PTA    Stairs Assessment/Treatment    Stairs, [place] Level   not tested  -AM    Recorded by   [AM] Lawson Flaherty PTA    Therapy Exercises    Bilateral Lower Extremities ankle pumps/circles;20 reps;AROM:;supine  -TA AROM:;20 reps;ankle pumps/circles;supine  -TA     Recorded by [TA] Barby Rodriguez PTA [TA]  Barby Rodriguez PTA     Orthotics Prosthetics    Additional Documentation   Orthosis Location (Group);Orthosis Management/Training (Group)  -AM    Recorded by   [AM] Lawson Flaherty PTA    Orthosis Location    Orthosis Location/Type   lower extremity  -AM    Orthosis, Lower Extremity   Left:;knee immobilizer  -AM    Recorded by   [AM] Lawson Flaherty PTA    Orthosis Management/Training    Orthosis Fabrication Detail knee immobilizer  -TA knee immobilizer  -TA Knee Immobilizer  -AM    Orthosis Indications   immobilize, protect/position healing structures  -AM    Orthosis Skills Training doffing orthosis;donning orthosis  -TA doffing orthosis;donning orthosis  -TA doffing orthosis;donning orthosis  -AM    Orthosis Wear Schedule wear full time  -TA wear full time  -TA wear full time  -AM    Recorded by [TA] Barby Rodriguez PTA [TA] Barby Rodriguez PTA [AM] Lawson Flaherty PTA    Sensory Assessment/Intervention    Light Touch   --  -AM    Recorded by   [AM] Lawson Flaherty PTA    Positioning and Restraints    Pre-Treatment Position in bed  -TA in bed  -TA in bed  -AM    Post Treatment Position bed  -TA bed  -TA bed  -AM    In Bed supine;call light within reach;exit alarm on  -TA supine;call light within reach  -TA supine;call light within reach;encouraged to call for assist;exit alarm on  -AM    Recorded by [TA] Barby Rodriguez PTA [TA] Barby Rodriguez PTA [AM] Lawson Flaherty PTA      User Key  (r) = Recorded By, (t) = Taken By, (c) = Cosigned By    Initials Name Effective Dates    TA Barby Rodriguez PTA 10/17/16 -     AM Lawson Flaherty PTA 10/17/16 -                 IP PT Goals       12/21/17 1410 12/20/17 0914       Bed Mobility PT STG    Bed Mobility PT STG, Date Established  12/20/17  -CB     Bed Mobility PT STG, Time to Achieve  2 - 3 days  -CB     Bed Mobility PT STG, Activity Type  supine to sit/sit to supine  -CB     Bed Mobility PT STG, [place] Level  contact guard assist  -CB     Transfer Training  Goal, Assist Device  bed rails  -CB     Bed Mobility PT STG, Additional Goal  HOB up and follwoing hip precautions  -CB     Transfer Training PT STG    Transfer Training PT STG, Date Established  12/20/17  -CB     Transfer Training PT STG, Time to Achieve  2 - 3 days  -CB     Transfer Training PT STG, Activity Type  bed to chair /chair to bed;sit to stand/stand to sit  -CB     Transfer Training PT STG, [place] Level  contact guard assist  -CB     Transfer Training PT STG, Assist Device  walker, rolling  -CB     Gait Training PT LTG    Gait Training Goal PT LTG, Date Established  12/20/17  -CB     Gait Training Goal PT LTG, Time to Achieve  by discharge  -CB     Gait Training Goal PT LTG, [place] Level  contact guard assist  -CB     Gait Training Goal PT LTG, Assist Device  walker, rolling  -CB     Gait Training Goal PT LTG, Distance to Achieve  100 feet  -CB     Strength Goal PT LTG    Strength Goal PT LTG, Date Established  12/20/17  -CB     Strength Goal PT LTG, Time to Achieve  by discharge  -CB     Strength Goal PT LTG, Measure to Achieve  20 reps all ex BLE activiely  -CB     Strength Goal PT LTG, Functional Goal  get legs up onto bed following hip precautions  -CB     Physical Therapy PT STG    Physical Therapy PT STG, Date Established  12/20/17  -CB     Physical Therapy PT STG, Time to Achieve  2 - 3 days  -CB     Physical Therapy PT STG, Activity Type  be able to verbalize hip precautions and follow them  -CB     Physical Therapy PT STG, [place] Level  independent;verbal cues required  -CB     Physical Therapy PT STG, Outcome goal ongoing  -TA        User Key  (r) = Recorded By, (t) = Taken By, (c) = Cosigned By    Initials Name Provider Type    CB Gisselle Perales, PT Physical Therapist    WISAM Rodriguez, PTA Physical Therapy Assistant          Physical Therapy Education     Title: PT OT SLP Therapies (Active)     Topic: Physical Therapy (Active)     Point: Mobility training (Active)     Learning Progress Summary    Learner Readiness Method Response Comment Documented by Status   Patient Nonacceptance D NR,NL   12/20/17 1257 Active               Point: Precautions (Active)    Learning Progress Summary    Learner Readiness Method Response Comment Documented by Status   Patient Nonacceptance D NR,NL   12/20/17 1257 Active                      User Key     Initials Effective Dates Name Provider Type Discipline     04/06/17 -  Gisselle Perales, PT Physical Therapist PT                    PT Recommendation and Plan  Anticipated Equipment Needs At Discharge:  (facility will provide)  Anticipated Discharge Disposition: skilled nursing facility  Planned Therapy Interventions: bed mobility training, gait training, home exercise program, transfer training, strengthening, patient/family education  PT Frequency: per priority policy (5-14)  Plan of Care Review  Outcome Summary/Follow up Plan: pt transfered with max assist of 1, pt stood x 1with RW & max Assist of 1, pt c/o L UE pain, pt will require 24/7 care & continued PT services          Outcome Measures       12/21/17 1300 12/20/17 1335 12/20/17 0914    How much help from another person do you currently need...    Turning from your back to your side while in flat bed without using bedrails? 2  -TA 2  -AM 2  -CB    Moving from lying on back to sitting on the side of a flat bed without bedrails? 2  -TA 2  -AM 2  -CB    Moving to and from a bed to a chair (including a wheelchair)? 2  -TA 2  -AM 1  -CB    Standing up from a chair using your arms (e.g., wheelchair, bedside chair)? 2  -TA 2  -AM 2  -CB    Climbing 3-5 steps with a railing? 1  -TA 1  -AM 1  -CB    To walk in hospital room? 1  -TA 1  -AM 1  -CB    AM-PAC 6 Clicks Score 10  -TA 10  -AM 9  -CB    Functional Assessment    Outcome Measure Options AM-PAC 6 Clicks Basic Mobility (PT)  -TA AM-PAC 6 Clicks Basic Mobility (PT)  -AM AM-PAC 6 Clicks Basic Mobility (PT)  -CB      User Key  (r) = Recorded  By, (t) = Taken By, (c) = Cosigned By    Initials Name Provider Type    CB Gisselle Perales, PT Physical Therapist    WISAM Rodriguez PTA Physical Therapy Assistant    VINCE Flaherty PTA Physical Therapy Assistant           Time Calculation:         PT Charges       12/21/17 1548 12/21/17 1302       Time Calculation    Start Time 1410  -TA 1009  -TA     Stop Time 1436  -TA 1048  -TA     Time Calculation (min) 26 min  -TA 39 min  -TA     Time Calculation- PT    Total Timed Code Minutes- PT 26 minute(s)  -TA 39 minute(s)  -TA       User Key  (r) = Recorded By, (t) = Taken By, (c) = Cosigned By    Initials Name Provider Type    WISAM Rodriguez PTA Physical Therapy Assistant          Therapy Charges for Today     Code Description Service Date Service Provider Modifiers Qty    14004253547 HC PT THERAPEUTIC ACT EA 15 MIN 12/21/2017 Barby Rodriguez PTA GP 3    16188443841 HC PT THERAPEUTIC ACT EA 15 MIN 12/21/2017 Barby Rodriguez PTA GP 2          PT G-Codes  PT Professional Judgement Used?: Yes  Outcome Measure Options: AM-PAC 6 Clicks Basic Mobility (PT)  Score: 9  Functional Limitation: Mobility: Walking and moving around  Mobility: Walking and Moving Around Current Status (): At least 60 percent but less than 80 percent impaired, limited or restricted  Mobility: Walking and Moving Around Goal Status (): At least 40 percent but less than 60 percent impaired, limited or restricted    Barby Rodriguez PTA  12/21/2017

## 2017-12-21 NOTE — NURSING NOTE
Patient complains of left arm pain and not being able to move it as well as his other side. Physical therapy stated he favored his right side more to try to not put weight on the left arm.

## 2017-12-21 NOTE — THERAPY TREATMENT NOTE
Acute Care - Physical Therapy Treatment Note  [facility]     Patient Name: Nickolas Muhammad  : 1945  MRN: [id number]  Today's Date: 2017  Onset of Illness/Injury or Date of Surgery Date: 17  Date of Referral to PT: 17  Referring Physician: Dr Talley    Admit Date: 2017    Visit Dx:    ICD-10-CM ICD-9-CM   1. Displaced fracture of base of neck of left femur, initial encounter for closed fracture S72.042A 820.03   2. Impaired physical mobility Z74.09 781.99     Patient Active Problem List   Diagnosis   • Depression   • Dementia with behavioral disturbance   • Displaced fracture of base of neck of left femur, initial encounter for closed fracture   • Atrial fibrillation   • History of subdural hematoma   • H/O unilateral nephrectomy   • Hypertension   • Fever               Adult Rehabilitation Note       17 1009 17 1335       Rehab Assessment/Intervention    Discipline physical therapy assistant  -TA physical therapy assistant  -AM     Document Type therapy note (daily note)  -TA therapy note (daily note)  -AM     Subjective Information agree to therapy  -TA agree to therapy;no complaints  -AM     Patient Effort, Rehab Treatment adequate  -TA adequate  -AM     Symptoms Noted During/After Treatment dizziness  -TA none  -AM     Precautions/Limitations fall precautions;hip precautions- left;other (see comments)   brace on when up, WBAT  -TA brace on when up;fall precautions;hip precautions- left;other (see comments)   WBAT LLE  -AM     Equipment Issued to Patient  gait belt  -AM     Recorded by [TA] Barby Rodriguez PTA [AM] Lawson Flaherty PTA     Vital Signs    Pre Systolic BP Rehab 137  -  -AM     Pre Treatment Diastolic BP 65  -TA 56  -AM     Post Systolic BP Rehab 130  -  -AM     Post Treatment Diastolic BP 59  -TA 59  -AM     Pretreatment Heart Rate (beats/min) 89  -TA 95  -AM     Posttreatment Heart Rate (beats/min) 83  -TA 86  -AM     Pre SpO2 (%) 92   -TA 93  -AM     O2 Delivery Pre Treatment room air  -TA room air  -AM     Post SpO2 (%) 92  -TA 92  -AM     O2 Delivery Post Treatment  room air  -AM     Pre Patient Position Supine  -TA Supine  -AM     Intra Patient Position  Standing  -AM     Post Patient Position Supine  -TA Supine  -AM     Recorded by [TA] Barby Rodriguez PTA [AM] Lawson Flaherty PTA     Pain Assessment    Pain Assessment 0-10  -TA Unable to assess  -AM     Pain Score unable to assess   pt states a little  -TA      Post Pain Score 5  -TA      Recorded by [TA] Barby Rodriguez PTA [AM] Lawson Flaherty PTA     Cognitive Assessment/Intervention    Current Cognitive/Communication Assessment functional  -TA functional  -AM     Orientation Status oriented x 4  -TA oriented x 4  -AM     Follows Commands/Answers Questions able to follow single-step instructions  -TA able to follow single-step instructions;needs cueing;needs increased time;needs repetition  -AM     Personal Safety mild impairment  -TA      Personal Safety Interventions gait belt;nonskid shoes/slippers when out of bed  -TA gait belt;nonskid shoes/slippers when out of bed;supervised activity  -AM     Recorded by [TA] Barby Rodriguez PTA [AM] Lawson Flaherty PTA     ROM (Range of Motion)    General ROM  lower extremity range of motion deficits identified  -AM     Recorded by  [AM] Lawson Flaherty PTA     General LE Assessment    ROM  LLE ROM was WFL  -AM     Recorded by  [AM] Lawson Flaherty PTA     Mobility Assessment/Training    Extremity Weight-Bearing Status left lower extremity  -TA left lower extremity  -AM     Left Lower Extremity Weight-Bearing weight-bearing as tolerated  -TA weight-bearing as tolerated  -AM     Recorded by [TA] Barby Rodriguez PTA [AM] Lawson Flaherty PTA     Bed Mobility, Assessment/Treatment    Bed Mobility, Assistive Device bed rails;head of bed elevated  -TA bed rails;head of bed elevated  -AM     Bed Mobility, Roll Left, [place]  not tested  -AM      Bed Mobility, Roll Right, [place]  not tested  -AM     Bed Mobility, Scoot/Bridge, [place] maximum assist (25% patient effort)   with drawsheet  -TA not tested  -AM     Bed Mob, Supine to Sit, [place] maximum assist (25% patient effort);verbal cues required  -TA dependent (less than 25% patient effort)  -AM     Bed Mob, Sit to Supine, [place] maximum assist (25% patient effort);verbal cues required  -TA dependent (less than 25% patient effort)  -AM     Bed Mob, Sidelying to Sit, [place]  not tested  -AM     Bed Mob, Sit to Sidelying, [place]  not tested  -AM     Bed Mobility, Safety Issues  cognitive deficits limit understanding;decreased use of arms for pushing/pulling;decreased use of legs for bridging/pushing  -AM     Bed Mobility, Impairments  ROM decreased;strength decreased  -AM     Recorded by [TA] Barby Rodriguez PTA [AM] Lawson Flaherty PTA     Transfer Assessment/Treatment    Transfers, Bed-Chair [place]  dependent (less than 25% patient effort);2 person assist required  -AM     Transfers, Chair-Bed [place]  dependent (less than 25% patient effort);2 person assist required  -AM     Transfers, Bed-Chair-Bed, Assist Device  other (see comments)   HHA  -AM     Transfers, Sit-Stand [place] moderate assist (50% patient effort)  -TA dependent (less than 25% patient effort);2 person assist required  -AM     Transfers, Stand-Sit [place] moderate assist (50% patient effort)  -TA dependent (less than 25% patient effort);2 person assist required  -AM     Transfers, Sit-Stand-Sit, Assist Device rolling walker  -TA other (see comments)   HHA  -AM     Toilet Transfer, [place]  not tested  -AM     Walk-In Shower Transfer, [place]  not tested  -AM     Bathtub Transfer, [place]  not tested  -AM     Transfer, Maintain Weight Bearing Status able to maintain weight bearing status  -TA able to maintain weight bearing status  -AM     Transfer, Safety Issues step  length decreased  -TA step length decreased  -AM     Transfer, Impairments strength decreased  -TA ROM decreased;strength decreased  -AM     Transfer, Comment pt stood x 2, pt unable to take steps  -TA      Recorded by [TA] Barby Rodriguez PTA [AM] Lawson Flaherty PTA     Gait Assessment/Treatment    Gait, [place] Level  not tested  -AM     Gait, Assistive Device  --  -AM     Gait, Impairments  --  -AM     Recorded by  [AM] Lawson Flaherty PTA     Stairs Assessment/Treatment    Stairs, [place] Level  not tested  -AM     Recorded by  [AM] Lawson Flaherty PTA     Therapy Exercises    Bilateral Lower Extremities AROM:;20 reps;ankle pumps/circles;supine  -TA      Recorded by [TA] Barby Rodriguez PTA      Orthotics Prosthetics    Additional Documentation  Orthosis Location (Group);Orthosis Management/Training (Group)  -AM     Recorded by  [AM] Lawson Flaherty PTA     Orthosis Location    Orthosis Location/Type  lower extremity  -AM     Orthosis, Lower Extremity  Left:;knee immobilizer  -AM     Recorded by  [AM] Lawson Flaherty PTA     Orthosis Management/Training    Orthosis Fabrication Detail knee immobilizer  -TA Knee Immobilizer  -AM     Orthosis Indications  immobilize, protect/position healing structures  -AM     Orthosis Skills Training doffing orthosis;donning orthosis  -TA doffing orthosis;donning orthosis  -AM     Orthosis Wear Schedule wear full time  -TA wear full time  -AM     Recorded by [TA] Barby Rodriguez PTA [AM] Lawson Flaherty PTA     Sensory Assessment/Intervention    Light Touch  --  -AM     Recorded by  [AM] Lawson Flaherty PTA     Positioning and Restraints    Pre-Treatment Position in bed  -TA in bed  -AM     Post Treatment Position bed  -TA bed  -AM     In Bed supine;call light within reach  -TA supine;call light within reach;encouraged to call for assist;exit alarm on  -AM     Recorded by [TA] Barby Rodriguez PTA [AM] Lawson Flaherty PTA       User Key  (r) = Recorded By, (t) =  Taken By, (c) = Cosigned By    Initials Name Effective Dates    TA Barby Rodriguez, PTA 10/17/16 -     AM Lawson Flaherty, PTA 10/17/16 -                 IP PT Goals       12/20/17 0914          Bed Mobility PT STG    Bed Mobility PT STG, Date Established 12/20/17  -CB      Bed Mobility PT STG, Time to Achieve 2 - 3 days  -CB      Bed Mobility PT STG, Activity Type supine to sit/sit to supine  -CB      Bed Mobility PT STG, [place] Level contact guard assist  -CB      Transfer Training Goal, Assist Device bed rails  -CB      Bed Mobility PT STG, Additional Goal HOB up and follwoing hip precautions  -CB      Transfer Training PT STG    Transfer Training PT STG, Date Established 12/20/17  -CB      Transfer Training PT STG, Time to Achieve 2 - 3 days  -CB      Transfer Training PT STG, Activity Type bed to chair /chair to bed;sit to stand/stand to sit  -CB      Transfer Training PT STG, [place] Level contact guard assist  -CB      Transfer Training PT STG, Assist Device walker, rolling  -CB      Gait Training PT LTG    Gait Training Goal PT LTG, Date Established 12/20/17  -CB      Gait Training Goal PT LTG, Time to Achieve by discharge  -CB      Gait Training Goal PT LTG, [place] Level contact guard assist  -CB      Gait Training Goal PT LTG, Assist Device walker, rolling  -CB      Gait Training Goal PT LTG, Distance to Achieve 100 feet  -CB      Strength Goal PT LTG    Strength Goal PT LTG, Date Established 12/20/17  -CB      Strength Goal PT LTG, Time to Achieve by discharge  -CB      Strength Goal PT LTG, Measure to Achieve 20 reps all ex BLE activiely  -CB      Strength Goal PT LTG, Functional Goal get legs up onto bed following hip precautions  -CB      Physical Therapy PT STG    Physical Therapy PT STG, Date Established 12/20/17  -CB      Physical Therapy PT STG, Time to Achieve 2 - 3 days  -CB      Physical Therapy PT STG, Activity Type be able to verbalize hip precautions and follow them  -CB       Physical Therapy PT STG, [place] Level independent;verbal cues required  -CB        User Key  (r) = Recorded By, (t) = Taken By, (c) = Cosigned By    Initials Name Provider Type    CB Gisselle Perales, PT Physical Therapist          Physical Therapy Education     Title: PT OT SLP Therapies (Active)     Topic: Physical Therapy (Active)     Point: Mobility training (Active)    Learning Progress Summary    Learner Readiness Method Response Comment Documented by Status   Patient Nonacceptance D NR,NL  CB 12/20/17 1257 Active               Point: Precautions (Active)    Learning Progress Summary    Learner Readiness Method Response Comment Documented by Status   Patient Nonacceptance D NR,NL  CB 12/20/17 1257 Active                      User Key     Initials Effective Dates Name Provider Type Discipline     04/06/17 -  Gisselle Perales, PT Physical Therapist PT                    PT Recommendation and Plan  Anticipated Equipment Needs At Discharge:  (facility will provide)  Anticipated Discharge Disposition: skilled nursing facility  Planned Therapy Interventions: bed mobility training, gait training, home exercise program, transfer training, strengthening, patient/family education  PT Frequency: per priority policy (5-14)             Outcome Measures       12/21/17 1300 12/20/17 1335 12/20/17 0914    How much help from another person do you currently need...    Turning from your back to your side while in flat bed without using bedrails? 2  -TA 2  -AM 2  -CB    Moving from lying on back to sitting on the side of a flat bed without bedrails? 2  -TA 2  -AM 2  -CB    Moving to and from a bed to a chair (including a wheelchair)? 2  -TA 2  -AM 1  -CB    Standing up from a chair using your arms (e.g., wheelchair, bedside chair)? 2  -TA 2  -AM 2  -CB    Climbing 3-5 steps with a railing? 1  -TA 1  -AM 1  -CB    To walk in hospital room? 1  -TA 1  -AM 1  -CB    AM-PAC 6 Clicks Score 10  -TA 10  -AM 9  -CB    Functional  Assessment    Outcome Measure Options AM-PAC 6 Clicks Basic Mobility (PT)  -TA AM-PAC 6 Clicks Basic Mobility (PT)  -AM AM-PAC 6 Clicks Basic Mobility (PT)  -CB      User Key  (r) = Recorded By, (t) = Taken By, (c) = Cosigned By    Initials Name Provider Type    CB Gisselle Perales, PT Physical Therapist    WISAM Rodriguez PTA Physical Therapy Assistant    AM Lawson Flaherty PTA Physical Therapy Assistant           Time Calculation:         PT Charges       12/21/17 1302          Time Calculation    Start Time 1009  -TA      Stop Time 1048  -TA      Time Calculation (min) 39 min  -TA      Time Calculation- PT    Total Timed Code Minutes- PT 39 minute(s)  -TA        User Key  (r) = Recorded By, (t) = Taken By, (c) = Cosigned By    Initials Name Provider Type    WISAM Rodriguez PTA Physical Therapy Assistant          Therapy Charges for Today     Code Description Service Date Service Provider Modifiers Qty    [id number] HC PT THERAPEUTIC ACT EA 15 MIN 12/21/2017 Barby Rodriguez PTA GP 3          PT G-Codes  PT Professional Judgement Used?: Yes  Outcome Measure Options: AM-PAC 6 Clicks Basic Mobility (PT)  Score: 9  Functional Limitation: Mobility: Walking and moving around  Mobility: Walking and Moving Around Current Status (): At least 60 percent but less than 80 percent impaired, limited or restricted  Mobility: Walking and Moving Around Goal Status (): At least 40 percent but less than 60 percent impaired, limited or restricted    Barby Rodriguez PTA  12/21/2017

## 2017-12-22 VITALS
HEART RATE: 101 BPM | OXYGEN SATURATION: 91 % | WEIGHT: 146.8 LBS | SYSTOLIC BLOOD PRESSURE: 137 MMHG | DIASTOLIC BLOOD PRESSURE: 94 MMHG | RESPIRATION RATE: 18 BRPM | HEIGHT: 66 IN | BODY MASS INDEX: 23.59 KG/M2 | TEMPERATURE: 100.6 F

## 2017-12-22 LAB
ALBUMIN SERPL-MCNC: 3.4 G/DL (ref 3.4–4.8)
ALBUMIN/GLOB SERPL: 1.1 G/DL (ref 1.1–1.8)
ALP SERPL-CCNC: 62 U/L (ref 38–126)
ALT SERPL W P-5'-P-CCNC: 23 U/L (ref 21–72)
ANION GAP SERPL CALCULATED.3IONS-SCNC: 10 MMOL/L (ref 5–15)
AST SERPL-CCNC: 32 U/L (ref 17–59)
BASOPHILS # BLD AUTO: 0.02 10*3/MM3 (ref 0–0.2)
BASOPHILS NFR BLD AUTO: 0.2 % (ref 0–2)
BILIRUB CONJ SERPL-MCNC: 0 MG/DL (ref 0–0.3)
BILIRUB SERPL-MCNC: 0.7 MG/DL (ref 0.2–1.3)
BUN BLD-MCNC: 13 MG/DL (ref 7–21)
BUN/CREAT SERPL: 14.3 (ref 7–25)
CALCIUM SPEC-SCNC: 8.4 MG/DL (ref 8.4–10.2)
CHLORIDE SERPL-SCNC: 101 MMOL/L (ref 95–110)
CO2 SERPL-SCNC: 23 MMOL/L (ref 22–31)
CREAT BLD-MCNC: 0.91 MG/DL (ref 0.7–1.3)
DEPRECATED RDW RBC AUTO: 48 FL (ref 35.1–43.9)
EOSINOPHIL # BLD AUTO: 0.37 10*3/MM3 (ref 0–0.7)
EOSINOPHIL NFR BLD AUTO: 3.7 % (ref 0–7)
ERYTHROCYTE [DISTWIDTH] IN BLOOD BY AUTOMATED COUNT: 14 % (ref 11.5–14.5)
GFR SERPL CREATININE-BSD FRML MDRD: 82 ML/MIN/1.73 (ref 42–98)
GLOBULIN UR ELPH-MCNC: 3.2 GM/DL (ref 2.3–3.5)
GLUCOSE BLD-MCNC: 105 MG/DL (ref 60–100)
HCT VFR BLD AUTO: 28.2 % (ref 39–49)
HGB BLD-MCNC: 9.2 G/DL (ref 13.7–17.3)
IMM GRANULOCYTES # BLD: 0.04 10*3/MM3 (ref 0–0.02)
IMM GRANULOCYTES NFR BLD: 0.4 % (ref 0–0.5)
LYMPHOCYTES # BLD AUTO: 0.97 10*3/MM3 (ref 0.6–4.2)
LYMPHOCYTES NFR BLD AUTO: 9.7 % (ref 10–50)
MCH RBC QN AUTO: 30.7 PG (ref 26.5–34)
MCHC RBC AUTO-ENTMCNC: 32.6 G/DL (ref 31.5–36.3)
MCV RBC AUTO: 94 FL (ref 80–98)
MONOCYTES # BLD AUTO: 1.19 10*3/MM3 (ref 0–0.9)
MONOCYTES NFR BLD AUTO: 11.9 % (ref 0–12)
NEUTROPHILS # BLD AUTO: 7.45 10*3/MM3 (ref 2–8.6)
NEUTROPHILS NFR BLD AUTO: 74.1 % (ref 37–80)
NRBC BLD MANUAL-RTO: 0 /100 WBC (ref 0–0)
PLATELET # BLD AUTO: 180 10*3/MM3 (ref 150–450)
PMV BLD AUTO: 11.2 FL (ref 8–12)
POTASSIUM BLD-SCNC: 4.2 MMOL/L (ref 3.5–5.1)
PROT SERPL-MCNC: 6.6 G/DL (ref 6.3–8.6)
RBC # BLD AUTO: 3 10*6/MM3 (ref 4.37–5.74)
SODIUM BLD-SCNC: 134 MMOL/L (ref 137–145)
WBC NRBC COR # BLD: 10.04 10*3/MM3 (ref 3.2–9.8)

## 2017-12-22 PROCEDURE — 94799 UNLISTED PULMONARY SVC/PX: CPT

## 2017-12-22 PROCEDURE — 94760 N-INVAS EAR/PLS OXIMETRY 1: CPT

## 2017-12-22 PROCEDURE — 97110 THERAPEUTIC EXERCISES: CPT

## 2017-12-22 PROCEDURE — 85025 COMPLETE CBC W/AUTO DIFF WBC: CPT | Performed by: INTERNAL MEDICINE

## 2017-12-22 PROCEDURE — 80053 COMPREHEN METABOLIC PANEL: CPT | Performed by: INTERNAL MEDICINE

## 2017-12-22 PROCEDURE — 82248 BILIRUBIN DIRECT: CPT | Performed by: INTERNAL MEDICINE

## 2017-12-22 RX ORDER — HYDROCODONE BITARTRATE AND ACETAMINOPHEN 7.5; 325 MG/1; MG/1
1 TABLET ORAL EVERY 6 HOURS PRN
Qty: 30 TABLET | Refills: 0 | Status: SHIPPED | OUTPATIENT
Start: 2017-12-22

## 2017-12-22 RX ORDER — CALCIUM CARBONATE 200(500)MG
1 TABLET,CHEWABLE ORAL DAILY PRN
Start: 2017-12-22

## 2017-12-22 RX ADMIN — Medication 12.5 MG: at 08:44

## 2017-12-22 RX ADMIN — VITAMIN D, TAB 1000IU (100/BT) 5000 UNITS: 25 TAB at 08:43

## 2017-12-22 RX ADMIN — ATORVASTATIN CALCIUM 10 MG: 10 TABLET, FILM COATED ORAL at 08:44

## 2017-12-22 RX ADMIN — PANTOPRAZOLE SODIUM 40 MG: 40 TABLET, DELAYED RELEASE ORAL at 06:19

## 2017-12-22 RX ADMIN — APIXABAN 2.5 MG: 2.5 TABLET, FILM COATED ORAL at 08:44

## 2017-12-22 RX ADMIN — POLYETHYLENE GLYCOL 3350 17 G: 17 POWDER, FOR SOLUTION ORAL at 08:48

## 2017-12-22 RX ADMIN — HYDROCODONE BITARTRATE AND ACETAMINOPHEN 1 TABLET: 7.5; 325 TABLET ORAL at 04:04

## 2017-12-22 RX ADMIN — LEVOTHYROXINE SODIUM 25 MCG: 25 TABLET ORAL at 08:44

## 2017-12-22 RX ADMIN — AMIODARONE HYDROCHLORIDE 200 MG: 200 TABLET ORAL at 08:45

## 2017-12-22 RX ADMIN — DOCUSATE SODIUM 100 MG: 100 CAPSULE, LIQUID FILLED ORAL at 08:48

## 2017-12-22 RX ADMIN — ALBUTEROL SULFATE 2.5 MG: 2.5 SOLUTION RESPIRATORY (INHALATION) at 06:32

## 2017-12-22 RX ADMIN — ALBUTEROL SULFATE 2.5 MG: 2.5 SOLUTION RESPIRATORY (INHALATION) at 10:39

## 2017-12-22 RX ADMIN — SERTRALINE HYDROCHLORIDE 100 MG: 50 TABLET ORAL at 08:44

## 2017-12-22 NOTE — THERAPY DISCHARGE NOTE
Acute Care - Physical Therapy Discharge Summary  [facility]       Patient Name: Nickolas Muhammad  : 1945  MRN: [id number]    Today's Date: 2017  Onset of Illness/Injury or Date of Surgery Date: 17    Date of Referral to PT: 17  Referring Physician: Dr Talley      Admit Date: 2017      PT Recommendation and Plan    Visit Dx:    ICD-10-CM ICD-9-CM   1. Displaced fracture of base of neck of left femur, initial encounter for closed fracture S72.042A 820.03   2. Impaired physical mobility Z74.09 781.99             Outcome Measures       17 1200 17 1300 17 1335    How much help from another person do you currently need...    Turning from your back to your side while in flat bed without using bedrails? 2  -TA 2  -TA 2  -AM    Moving from lying on back to sitting on the side of a flat bed without bedrails? 2  -TA 2  -TA 2  -AM    Moving to and from a bed to a chair (including a wheelchair)? 2  -TA 2  -TA 2  -AM    Standing up from a chair using your arms (e.g., wheelchair, bedside chair)? 2  -TA 2  -TA 2  -AM    Climbing 3-5 steps with a railing? 1  -TA 1  -TA 1  -AM    To walk in hospital room? 1  -TA 1  -TA 1  -AM    AM-PAC 6 Clicks Score 10  -TA 10  -TA 10  -AM    Functional Assessment    Outcome Measure Options AM-PAC 6 Clicks Basic Mobility (PT)  -TA AM-PAC 6 Clicks Basic Mobility (PT)  -TA AM-PAC 6 Clicks Basic Mobility (PT)  -AM      17 0914          How much help from another person do you currently need...    Turning from your back to your side while in flat bed without using bedrails? 2  -CB      Moving from lying on back to sitting on the side of a flat bed without bedrails? 2  -CB      Moving to and from a bed to a chair (including a wheelchair)? 1  -CB      Standing up from a chair using your arms (e.g., wheelchair, bedside chair)? 2  -CB      Climbing 3-5 steps with a railing? 1  -CB      To walk in hospital room? 1  -CB      AM-PAC 6 Clicks Score 9   -CB      Functional Assessment    Outcome Measure Options AM-PAC 6 Clicks Basic Mobility (PT)  -CB        User Key  (r) = Recorded By, (t) = Taken By, (c) = Cosigned By    Initials Name Provider Type    CB Gisselle Perales, PT Physical Therapist    WISAM Rodriguez, PTA Physical Therapy Assistant    AM Lawson Flaherty, PTA Physical Therapy Assistant                PT Charges       12/22/17 1257          Time Calculation    Start Time 0914  -TA      Stop Time 0948  -TA      Time Calculation (min) 34 min  -TA      Time Calculation- PT    Total Timed Code Minutes- PT 34 minute(s)  -TA        User Key  (r) = Recorded By, (t) = Taken By, (c) = Cosigned By    Initials Name Provider Type    WISAM Rodriguez, PTA Physical Therapy Assistant                  IP PT Goals       12/22/17 1416 12/22/17 0914 12/21/17 1410    Bed Mobility PT STG    Bed Mobility PT STG, Date Goal Reviewed 12/22/17  -MN      Bed Mobility PT STG, Outcome goal not met  -MN goal ongoing  -TA     Bed Mobility PT STG, Reason Goal Not Met discharged from facility  -MN      Transfer Training PT STG    Transfer Training PT STG, Date Goal Reviewed 12/22/17  -MN      Transfer Training PT STG, Outcome goal not met  -MN goal ongoing  -TA     Transfer Training PT STG, Reason Goal Not Met discharged from facility  -MN      Gait Training PT LTG    Gait Training Goal PT LTG, Date Goal Reviewed 12/22/17  -MN      Gait Training Goal PT LTG, Outcome goal not met  -MN goal ongoing  -TA     Gait Training Goal PT LTG, Reason Goal Not Met discharged from facility  -MN      Strength Goal PT LTG    Strength Goal PT LTG, Date Goal Reviewed 12/22/17  -MN      Strength Goal PT LTG, Outcome goal not met  -MN goal ongoing  -TA     Strength Goal PT LTG, Reason Goal Not Met discharged from facility  -MN      Physical Therapy PT STG    Physical Therapy PT STG, Date Goal Reviewed 12/22/17  -MN      Physical Therapy PT STG, Outcome goal not met  -MN  goal ongoing  -TA    Physical  Therapy PT STG, Reason Goal Not Met discharged from facility  -MN        12/20/17 0914          Bed Mobility PT STG    Bed Mobility PT STG, Date Established 12/20/17  -CB      Bed Mobility PT STG, Time to Achieve 2 - 3 days  -CB      Bed Mobility PT STG, Activity Type supine to sit/sit to supine  -CB      Bed Mobility PT STG, [place] Level contact guard assist  -CB      Transfer Training Goal, Assist Device bed rails  -CB      Bed Mobility PT STG, Additional Goal HOB up and follwoing hip precautions  -CB      Transfer Training PT STG    Transfer Training PT STG, Date Established 12/20/17  -CB      Transfer Training PT STG, Time to Achieve 2 - 3 days  -CB      Transfer Training PT STG, Activity Type bed to chair /chair to bed;sit to stand/stand to sit  -CB      Transfer Training PT STG, [place] Level contact guard assist  -CB      Transfer Training PT STG, Assist Device walker, rolling  -CB      Gait Training PT LTG    Gait Training Goal PT LTG, Date Established 12/20/17  -CB      Gait Training Goal PT LTG, Time to Achieve by discharge  -CB      Gait Training Goal PT LTG, [place] Level contact guard assist  -CB      Gait Training Goal PT LTG, Assist Device walker, rolling  -CB      Gait Training Goal PT LTG, Distance to Achieve 100 feet  -CB      Strength Goal PT LTG    Strength Goal PT LTG, Date Established 12/20/17  -CB      Strength Goal PT LTG, Time to Achieve by discharge  -CB      Strength Goal PT LTG, Measure to Achieve 20 reps all ex BLE activiely  -CB      Strength Goal PT LTG, Functional Goal get legs up onto bed following hip precautions  -CB      Physical Therapy PT STG    Physical Therapy PT STG, Date Established 12/20/17  -CB      Physical Therapy PT STG, Time to Achieve 2 - 3 days  -CB      Physical Therapy PT STG, Activity Type be able to verbalize hip precautions and follow them  -CB      Physical Therapy PT STG, [place] Level independent;verbal cues required  -CB        User Key   (r) = Recorded By, (t) = Taken By, (c) = Cosigned By    Initials Name Provider Type    CB Gisselle Perales, PT Physical Therapist    SARAI Kimbrough, PT Physical Therapist    TA Barby Rodriguez, PTA Physical Therapy Assistant              PT Discharge Summary  Anticipated Discharge Disposition: skilled nursing facility  Reason for Discharge: Discharge from facility, Per MD order  Outcomes Achieved: Unable to make functional progress toward goals at this time  Discharge Destination: SNF      Opal Kimbrough, PT   12/22/2017

## 2017-12-22 NOTE — DISCHARGE INSTRUCTIONS
Hip Fracture  A hip fracture is a fracture of the upper part of your thigh bone (femur).   CAUSES  A hip fracture is caused by a direct blow to the side of your hip. This is usually the result of a fall but can occur in other circumstances, such as an automobile accident.  RISK FACTORS  There is an increased risk of hip fractures in people with:  · An unsteady walking pattern (gait) and those with conditions that contribute to poor balance, such as Parkinson's disease or dementia.  · Osteopenia and osteoporosis.  · Cancer that spreads to the leg bones.  · Certain metabolic diseases.  SYMPTOMS   Symptoms of hip fracture include:  · Pain over the injured hip.  · Inability to put weight on the leg in which the fracture occurred (although, some patients are able to walk after a hip fracture).  · Toes and foot of the affected leg point outward when you lie down.  DIAGNOSIS  A physical exam can determine if a hip fracture is likely to have occurred. X-ray exams are needed to confirm the fracture and to look for other injuries. The X-ray exam can help to determine the type of hip fracture. Rarely, the fracture is not visible on an X-ray image and a CT scan or MRI will have to be done.  TREATMENT   The treatment for a fracture is usually surgery. This means using a screw, nail, or daniel to hold the bones in place.   HOME CARE INSTRUCTIONS  Take all medicines as directed by your health care provider.  SEEK MEDICAL CARE IF:  Pain continues, even after taking pain medicine.  MAKE SURE YOU:  · Understand these instructions.    · Will watch your condition.  · Will get help right away if you are not doing well or get worse.     This information is not intended to replace advice given to you by your health care provider. Make sure you discuss any questions you have with your health care provider.     Document Released: 12/18/2006 Document Revised: 12/23/2014 Document Reviewed: 07/30/2014  Elsevier Interactive Patient Education ©2017  [employer]

## 2017-12-22 NOTE — PLAN OF CARE
Problem: Patient Care Overview (Adult)  Goal: Plan of Care Review  Outcome: Ongoing (interventions implemented as appropriate)   12/22/17 0129   Coping/Psychosocial Response Interventions   Plan Of Care Reviewed With patient   Patient Care Overview   Progress no change   Outcome Evaluation   Outcome Summary/Follow up Plan pt resting well throughout the night, VSS, no complaints of pain     Goal: Adult Individualization and Mutuality  Outcome: Ongoing (interventions implemented as appropriate)    Goal: Discharge Needs Assessment  Outcome: Ongoing (interventions implemented as appropriate)      Problem: Fall Risk (Adult)  Goal: Absence of Falls  Outcome: Ongoing (interventions implemented as appropriate)

## 2017-12-22 NOTE — THERAPY TREATMENT NOTE
Acute Care - Physical Therapy Treatment Note  [facility]     Patient Name: Nickolas Muhammad  : 1945  MRN: [id number]  Today's Date: 2017  Onset of Illness/Injury or Date of Surgery Date: 17  Date of Referral to PT: 17  Referring Physician: Dr Talley    Admit Date: 2017    Visit Dx:    ICD-10-CM ICD-9-CM   1. Displaced fracture of base of neck of left femur, initial encounter for closed fracture S72.042A 820.03   2. Impaired physical mobility Z74.09 781.99     Patient Active Problem List   Diagnosis   • Depression   • Dementia with behavioral disturbance   • Displaced fracture of base of neck of left femur, initial encounter for closed fracture   • Atrial fibrillation   • History of subdural hematoma   • H/O unilateral nephrectomy   • Hypertension   • Fever               Adult Rehabilitation Note       17 0914 17 1410 17 1009    Rehab Assessment/Intervention    Discipline physical therapy assistant  -TA physical therapy assistant  -TA physical therapy assistant  -TA    Document Type therapy note (daily note)  -TA therapy note (daily note)  -TA therapy note (daily note)  -TA    Subjective Information agree to therapy  -TA agree to therapy  -TA agree to therapy  -TA    Patient Effort, Rehab Treatment adequate  -TA adequate  -TA adequate  -TA    Symptoms Noted During/After Treatment   dizziness  -TA    Precautions/Limitations fall precautions;hip precautions- left  -TA fall precautions;hip precautions- left  -TA fall precautions;hip precautions- left;other (see comments)   brace on when up, WBAT  -TA    Specific Treatment Considerations pt declined EOB/OOB  -TA pt c/o pain of L arm, nurse informed  -TA     Recorded by [TA] Barby Rodriguez, BELLE [TA] Barby Rodriguez, BELLE [TA] Barby Rodriguez, PTA    Vital Signs    Pre Systolic BP Rehab 138  -  -  -TA    Pre Treatment Diastolic BP 86  -TA 56  -TA 65  -TA    Post Systolic BP Rehab 114  -  -  -TA     Post Treatment Diastolic BP 70  -TA 63  -TA 59  -TA    Pretreatment Heart Rate (beats/min) 97  -TA 90  -TA 89  -TA    Posttreatment Heart Rate (beats/min) 95  -TA 87  -TA 83  -TA    Pre SpO2 (%) 94  -TA 94  -TA 92  -TA    O2 Delivery Pre Treatment room air  -TA room air  -TA room air  -TA    Post SpO2 (%) 93  -TA 92  -TA 92  -TA    Pre Patient Position Supine  -TA Supine  -TA Supine  -TA    Intra Patient Position Supine  -TA      Post Patient Position Supine  -TA Supine  -TA Supine  -TA    Recorded by [TA] Barby Rodriguez PTA [TA] Barby Rodriguez, PTA [TA] Barby Rodriguez, EBLLE    Pain Assessment    Pain Assessment 0-10  -TA 0-10  -TA 0-10  -TA    Pain Score 5  -TA 5  -TA unable to assess   pt states a little  -TA    Post Pain Score 5  -TA 5  -TA 5  -TA    Pain Type Acute pain  -TA Acute pain  -TA     Pain Location Leg  -TA Leg  -TA     Pain Orientation Left  -TA Left  -TA     Recorded by [TA] Barby Rodriguez, BELLE [TA] Barby Rodriguez, PTA [TA] Barby Rodriguez PTA    Pain 2    Pain Score 2  5  -TA     Pre Tx Pain Score 2  5  -TA     Post Tx Pain Score 2  5  -TA     Pain Location 2  Arm  -TA     Pain Orientation 2  Left  -TA     Recorded by  [TA] Barby Rodriguez PTA     Cognitive Assessment/Intervention    Current Cognitive/Communication Assessment functional  -TA functional  -TA functional  -TA    Orientation Status oriented x 4  -TA oriented x 4  -TA oriented x 4  -TA    Follows Commands/Answers Questions 100% of the time  -% of the time  -TA able to follow single-step instructions  -TA    Personal Safety mild impairment  -TA mild impairment  -TA mild impairment  -TA    Personal Safety Interventions  gait belt;nonskid shoes/slippers when out of bed  -TA gait belt;nonskid shoes/slippers when out of bed  -TA    Recorded by [TA] Barby Rodriguez, BELLE [TA] Barby Rodriguez, PTA [TA] aBrby Rodriguez PTA    Mobility Assessment/Training    Extremity Weight-Bearing Status left lower extremity  -TA left lower extremity  -TA  left lower extremity  -TA    Left Lower Extremity Weight-Bearing weight-bearing as tolerated  -TA weight-bearing as tolerated  -TA weight-bearing as tolerated  -TA    Recorded by [TA] Barby Rodriguez PTA [TA] Barby Rodriguez PTA [TA] Barby Rodriguez PTA    Bed Mobility, Assessment/Treatment    Bed Mobility, Assistive Device  bed rails;head of bed elevated  -TA bed rails;head of bed elevated  -TA    Bed Mobility, Scoot/Bridge, [place]  maximum assist (25% patient effort)  -TA maximum assist (25% patient effort)   with drawsheet  -TA    Bed Mob, Supine to Sit, [place] not tested  -TA maximum assist (25% patient effort);verbal cues required  -TA maximum assist (25% patient effort);verbal cues required  -TA    Bed Mob, Sit to Supine, [place] not tested  -TA maximum assist (25% patient effort);verbal cues required  -TA maximum assist (25% patient effort);verbal cues required  -TA    Recorded by [TA] Barby Rodriguez PTA [TA] Barby Rodriguez PTA [TA] Barby Rodriguez PTA    Transfer Assessment/Treatment    Transfers, Sit-Stand [place]   moderate assist (50% patient effort)  -TA    Transfers, Stand-Sit [place]   moderate assist (50% patient effort)  -TA    Transfers, Sit-Stand-Sit, Assist Device   rolling walker  -TA    Transfer, Maintain Weight Bearing Status   able to maintain weight bearing status  -TA    Transfer, Safety Issues   step length decreased  -TA    Transfer, Impairments   strength decreased  -TA    Transfer, Comment   pt stood x 2, pt unable to take steps  -TA    Recorded by   [TA] Barby Rodriguez PTA    Therapy Exercises    Right Lower Extremity AAROM:;10 reps;heel slides;hip abduction/adduction;supine  -TA      Bilateral Lower Extremities AROM:;20 reps;ankle pumps/circles;quad sets;glut sets;supine  -TA ankle pumps/circles;20 reps;AROM:;supine  -TA AROM:;20 reps;ankle pumps/circles;supine  -TA    Recorded by [WISAM] Barby Rodriguez PTA [TA] Barby Rodriguez PTA [TA] Barby Rodriguez PTA     Orthosis Management/Training    Orthosis Fabrication Detail knee immobilizer  -TA knee immobilizer  -TA knee immobilizer  -TA    Orthosis Skills Training doffing orthosis;donning orthosis  -TA doffing orthosis;donning orthosis  -TA doffing orthosis;donning orthosis  -TA    Orthosis Wear Schedule wear full time  -TA wear full time  -TA wear full time  -TA    Recorded by [TA] Barby Rodriguez PTA [TA] Barby Rodriguez, BELLE [TA] Barby Rodriguez PTA    Positioning and Restraints    Pre-Treatment Position in bed  -TA in bed  -TA in bed  -TA    Post Treatment Position bed  -TA bed  -TA bed  -TA    In Bed supine;call light within reach  -TA supine;call light within reach;exit alarm on  -TA supine;call light within reach  -TA    Recorded by [TA] Barby Rodriguez PTA [TA] Barby Rodriguez PTA [TA] Barby Rodriguez PTA      12/20/17 1335          Rehab Assessment/Intervention    Discipline physical therapy assistant  -AM      Document Type therapy note (daily note)  -AM      Subjective Information agree to therapy;no complaints  -AM      Patient Effort, Rehab Treatment adequate  -AM      Symptoms Noted During/After Treatment none  -AM      Precautions/Limitations brace on when up;fall precautions;hip precautions- left;other (see comments)   WBAT LLE  -AM      Equipment Issued to Patient gait belt  -AM      Recorded by [AM] Lawson Flaherty PTA      Vital Signs    Pre Systolic BP Rehab 116  -AM      Pre Treatment Diastolic BP 56  -AM      Post Systolic BP Rehab 110  -AM      Post Treatment Diastolic BP 59  -AM      Pretreatment Heart Rate (beats/min) 95  -AM      Posttreatment Heart Rate (beats/min) 86  -AM      Pre SpO2 (%) 93  -AM      O2 Delivery Pre Treatment room air  -AM      Post SpO2 (%) 92  -AM      O2 Delivery Post Treatment room air  -AM      Pre Patient Position Supine  -AM      Intra Patient Position Standing  -AM      Post Patient Position Supine  -AM      Recorded by [AM] Lawson Flaherty PTA      Pain Assessment     Pain Assessment Unable to assess  -AM      Recorded by [AM] Lawson Flaherty PTA      Cognitive Assessment/Intervention    Current Cognitive/Communication Assessment functional  -AM      Orientation Status oriented x 4  -AM      Follows Commands/Answers Questions able to follow single-step instructions;needs cueing;needs increased time;needs repetition  -AM      Personal Safety Interventions gait belt;nonskid shoes/slippers when out of bed;supervised activity  -AM      Recorded by [AM] Lawson Flaherty PTA      ROM (Range of Motion)    General ROM lower extremity range of motion deficits identified  -AM      Recorded by [AM] Lawson Flaherty PTA      General LE Assessment    ROM LLE ROM was WFL  -AM      Recorded by [AM] Lawson Flaherty PTA      Mobility Assessment/Training    Extremity Weight-Bearing Status left lower extremity  -AM      Left Lower Extremity Weight-Bearing weight-bearing as tolerated  -AM      Recorded by [AM] Lawson Flaherty PTA      Bed Mobility, Assessment/Treatment    Bed Mobility, Assistive Device bed rails;head of bed elevated  -AM      Bed Mobility, Roll Left, [place] not tested  -AM      Bed Mobility, Roll Right, [place] not tested  -AM      Bed Mobility, Scoot/Bridge, [place] not tested  -AM      Bed Mob, Supine to Sit, [place] dependent (less than 25% patient effort)  -AM      Bed Mob, Sit to Supine, [place] dependent (less than 25% patient effort)  -AM      Bed Mob, Sidelying to Sit, [place] not tested  -AM      Bed Mob, Sit to Sidelying, [place] not tested  -AM      Bed Mobility, Safety Issues cognitive deficits limit understanding;decreased use of arms for pushing/pulling;decreased use of legs for bridging/pushing  -AM      Bed Mobility, Impairments ROM decreased;strength decreased  -AM      Recorded by [AM] Lawson Flaherty PTA      Transfer Assessment/Treatment    Transfers, Bed-Chair [place] dependent (less than 25% patient effort);2  person assist required  -AM      Transfers, Chair-Bed [place] dependent (less than 25% patient effort);2 person assist required  -AM      Transfers, Bed-Chair-Bed, Assist Device other (see comments)   HHA  -AM      Transfers, Sit-Stand [place] dependent (less than 25% patient effort);2 person assist required  -AM      Transfers, Stand-Sit [place] dependent (less than 25% patient effort);2 person assist required  -AM      Transfers, Sit-Stand-Sit, Assist Device other (see comments)   HHA  -AM      Toilet Transfer, [place] not tested  -AM      Walk-In Shower Transfer, [place] not tested  -AM      Bathtub Transfer, [place] not tested  -AM      Transfer, Maintain Weight Bearing Status able to maintain weight bearing status  -AM      Transfer, Safety Issues step length decreased  -AM      Transfer, Impairments ROM decreased;strength decreased  -AM      Recorded by [AM] Lawson Flaherty PTA      Gait Assessment/Treatment    Gait, [place] Level not tested  -AM      Gait, Assistive Device --  -AM      Gait, Impairments --  -AM      Recorded by [AM] Lawson Flaherty PTA      Stairs Assessment/Treatment    Stairs, [place] Level not tested  -AM      Recorded by [AM] Lawson Flaherty PTA      Orthotics Prosthetics    Additional Documentation Orthosis Location (Group);Orthosis Management/Training (Group)  -AM      Recorded by [AM] Lawson Flaherty PTA      Orthosis Location    Orthosis Location/Type lower extremity  -AM      Orthosis, Lower Extremity Left:;knee immobilizer  -AM      Recorded by [AM] Lawson Flaherty PTA      Orthosis Management/Training    Orthosis Fabrication Detail Knee Immobilizer  -AM      Orthosis Indications immobilize, protect/position healing structures  -AM      Orthosis Skills Training doffing orthosis;donning orthosis  -AM      Orthosis Wear Schedule wear full time  -AM      Recorded by [AM] Lawson Flaherty PTA      Sensory Assessment/Intervention    Light  Touch --  -AM      Recorded by [AM] Lawson Flaherty PTA      Positioning and Restraints    Pre-Treatment Position in bed  -AM      Post Treatment Position bed  -AM      In Bed supine;call light within reach;encouraged to call for assist;exit alarm on  -AM      Recorded by [AM] Lawson Flaherty PTA        User Key  (r) = Recorded By, (t) = Taken By, (c) = Cosigned By    Initials Name Effective Dates    TA Barby Rodriguez, PTA 10/17/16 -     AM Lawson Flaherty PTA 10/17/16 -                 IP PT Goals       12/22/17 0914 12/21/17 1410 12/20/17 0914    Bed Mobility PT STG    Bed Mobility PT STG, Date Established   12/20/17  -CB    Bed Mobility PT STG, Time to Achieve   2 - 3 days  -CB    Bed Mobility PT STG, Activity Type   supine to sit/sit to supine  -CB    Bed Mobility PT STG, [place] Level   contact guard assist  -CB    Transfer Training Goal, Assist Device   bed rails  -CB    Bed Mobility PT STG, Additional Goal   HOB up and follwoing hip precautions  -CB    Bed Mobility PT STG, Outcome goal ongoing  -TA      Transfer Training PT STG    Transfer Training PT STG, Date Established   12/20/17  -CB    Transfer Training PT STG, Time to Achieve   2 - 3 days  -CB    Transfer Training PT STG, Activity Type   bed to chair /chair to bed;sit to stand/stand to sit  -CB    Transfer Training PT STG, [place] Level   contact guard assist  -CB    Transfer Training PT STG, Assist Device   walker, rolling  -CB    Transfer Training PT STG, Outcome goal ongoing  -TA      Gait Training PT LTG    Gait Training Goal PT LTG, Date Established   12/20/17  -CB    Gait Training Goal PT LTG, Time to Achieve   by discharge  -CB    Gait Training Goal PT LTG, [place] Level   contact guard assist  -CB    Gait Training Goal PT LTG, Assist Device   walker, rolling  -CB    Gait Training Goal PT LTG, Distance to Achieve   100 feet  -CB    Gait Training Goal PT LTG, Outcome goal ongoing  -TA      Strength Goal PT LTG    Strength Goal  PT LTG, Date Established   12/20/17  -CB    Strength Goal PT LTG, Time to Achieve   by discharge  -CB    Strength Goal PT LTG, Measure to Achieve   20 reps all ex BLE activiely  -CB    Strength Goal PT LTG, Functional Goal   get legs up onto bed following hip precautions  -CB    Strength Goal PT LTG, Outcome goal ongoing  -TA      Physical Therapy PT STG    Physical Therapy PT STG, Date Established   12/20/17  -CB    Physical Therapy PT STG, Time to Achieve   2 - 3 days  -CB    Physical Therapy PT STG, Activity Type   be able to verbalize hip precautions and follow them  -CB    Physical Therapy PT STG, [place] Level   independent;verbal cues required  -CB    Physical Therapy PT STG, Outcome  goal ongoing  -TA       User Key  (r) = Recorded By, (t) = Taken By, (c) = Cosigned By    Initials Name Provider Type    CB Gisselle Perales, PT Physical Therapist    WISAM Rodriguez, PTA Physical Therapy Assistant          Physical Therapy Education     Title: PT OT SLP Therapies (Active)     Topic: Physical Therapy (Active)     Point: Mobility training (Active)    Learning Progress Summary    Learner Readiness Method Response Comment Documented by Status   Patient Nonacceptance D NR,NL   12/20/17 1257 Active               Point: Precautions (Active)    Learning Progress Summary    Learner Readiness Method Response Comment Documented by Status   Patient Nonacceptance D NR,NL   12/20/17 1257 Active                      User Key     Initials Effective Dates Name Provider Type Discipline     04/06/17 -  Gisselle Perales, PT Physical Therapist PT                    PT Recommendation and Plan  Anticipated Equipment Needs At Discharge:  (facility will provide)  Anticipated Discharge Disposition: skilled nursing facility  Planned Therapy Interventions: bed mobility training, gait training, home exercise program, transfer training, strengthening, patient/family education  PT Frequency: per priority policy (5-14)  Plan of  Care Review  Outcome Summary/Follow up Plan: pt declined EOB/OOB this Tx, pt performed bed exercises, pt will require 24/7 care & continued PT services          Outcome Measures       12/22/17 1200 12/21/17 1300 12/20/17 1335    How much help from another person do you currently need...    Turning from your back to your side while in flat bed without using bedrails? 2  -TA 2  -TA 2  -AM    Moving from lying on back to sitting on the side of a flat bed without bedrails? 2  -TA 2  -TA 2  -AM    Moving to and from a bed to a chair (including a wheelchair)? 2  -TA 2  -TA 2  -AM    Standing up from a chair using your arms (e.g., wheelchair, bedside chair)? 2  -TA 2  -TA 2  -AM    Climbing 3-5 steps with a railing? 1  -TA 1  -TA 1  -AM    To walk in hospital room? 1  -TA 1  -TA 1  -AM    AM-PAC 6 Clicks Score 10  -TA 10  -TA 10  -AM    Functional Assessment    Outcome Measure Options AM-PAC 6 Clicks Basic Mobility (PT)  -TA AM-PAC 6 Clicks Basic Mobility (PT)  -TA AM-PAC 6 Clicks Basic Mobility (PT)  -AM      12/20/17 0914          How much help from another person do you currently need...    Turning from your back to your side while in flat bed without using bedrails? 2  -CB      Moving from lying on back to sitting on the side of a flat bed without bedrails? 2  -CB      Moving to and from a bed to a chair (including a wheelchair)? 1  -CB      Standing up from a chair using your arms (e.g., wheelchair, bedside chair)? 2  -CB      Climbing 3-5 steps with a railing? 1  -CB      To walk in hospital room? 1  -CB      AM-PAC 6 Clicks Score 9  -CB      Functional Assessment    Outcome Measure Options AM-PAC 6 Clicks Basic Mobility (PT)  -CB        User Key  (r) = Recorded By, (t) = Taken By, (c) = Cosigned By    Initials Name Provider Type    CB Gisselle Perales, PT Physical Therapist    WISAM Rodriguez, PTA Physical Therapy Assistant    VINCE Flaherty, PTA Physical Therapy Assistant           Time Calculation:         PT  Charges       12/22/17 1257          Time Calculation    Start Time 0914  -TA      Stop Time 0948  -TA      Time Calculation (min) 34 min  -TA      Time Calculation- PT    Total Timed Code Minutes- PT 34 minute(s)  -TA        User Key  (r) = Recorded By, (t) = Taken By, (c) = Cosigned By    Initials Name Provider Type    WISAM Rodriguez PTA Physical Therapy Assistant          Therapy Charges for Today     Code Description Service Date Service Provider Modifiers Qty    68532111952 HC PT THERAPEUTIC ACT EA 15 MIN 12/21/2017 Barby Rodriguez PTA GP 3    51966988514 HC PT THERAPEUTIC ACT EA 15 MIN 12/21/2017 Barby Rodriguez PTA GP 2    77305468806 HC PT THER PROC EA 15 MIN 12/22/2017 Barby Rodriguez, BELLE GP 2          PT G-Codes  PT Professional Judgement Used?: Yes  Outcome Measure Options: AM-PAC 6 Clicks Basic Mobility (PT)  Score: 9  Functional Limitation: Mobility: Walking and moving around  Mobility: Walking and Moving Around Current Status (): At least 60 percent but less than 80 percent impaired, limited or restricted  Mobility: Walking and Moving Around Goal Status (): At least 40 percent but less than 60 percent impaired, limited or restricted    Barby Rodriguez PTA  12/22/2017

## 2017-12-22 NOTE — PLAN OF CARE
Problem: Inpatient Physical Therapy  Goal: Bed Mobility Goal STG- PT  Outcome: Unable to achieve outcome(s) by discharge Date Met: 12/22/17 12/20/17 0914 12/22/17 1416   Bed Mobility PT STG   Bed Mobility PT STG, Date Established 12/20/17 --    Bed Mobility PT STG, Time to Achieve 2 - 3 days --    Bed Mobility PT STG, Activity Type supine to sit/sit to supine --    Bed Mobility PT STG, [place] Level contact guard assist --    Transfer Training Goal, Assist Device bed rails --    Bed Mobility PT STG, Additional Goal HOB up and follwoing hip precautions --    Bed Mobility PT STG, Date Goal Reviewed --  12/22/17   Bed Mobility PT STG, Outcome --  goal not met   Bed Mobility PT STG, Reason Goal Not Met --  discharged from facility     Goal: Transfer Training Goal 1 STG- PT  Outcome: Unable to achieve outcome(s) by discharge Date Met: 12/22/17 12/20/17 0914 12/22/17 1416   Transfer Training PT STG   Transfer Training PT STG, Date Established 12/20/17 --    Transfer Training PT STG, Time to Achieve 2 - 3 days --    Transfer Training PT STG, Activity Type bed to chair /chair to bed;sit to stand/stand to sit --    Transfer Training PT STG, [place] Level contact guard assist --    Transfer Training PT STG, Assist Device walker, rolling --    Transfer Training PT STG, Date Goal Reviewed --  12/22/17   Transfer Training PT STG, Outcome --  goal not met   Transfer Training PT STG, Reason Goal Not Met --  discharged from facility     Goal: Gait Training Goal LTG- PT  Outcome: Unable to achieve outcome(s) by discharge Date Met: 12/22/17 12/20/17 0914 12/22/17 1416   Gait Training PT LTG   Gait Training Goal PT LTG, Date Established 12/20/17 --    Gait Training Goal PT LTG, Time to Achieve by discharge --    Gait Training Goal PT LTG, [place] Level contact guard assist --    Gait Training Goal PT LTG, Assist Device walker, rolling --    Gait Training Goal PT LTG, Distance to Achieve 100 feet --    Gait  Training Goal PT LTG, Date Goal Reviewed --  12/22/17   Gait Training Goal PT LTG, Outcome --  goal not met   Gait Training Goal PT LTG, Reason Goal Not Met --  discharged from facility     Goal: Strength Goal LTG- PT  Outcome: Unable to achieve outcome(s) by discharge Date Met: 12/22/17 12/20/17 0914 12/22/17 1416   Strength Goal PT LTG   Strength Goal PT LTG, Date Established 12/20/17 --    Strength Goal PT LTG, Time to Achieve by discharge --    Strength Goal PT LTG, Measure to Achieve 20 reps all ex BLE activiely --    Strength Goal PT LTG, Functional Goal get legs up onto bed following hip precautions --    Strength Goal PT LTG, Date Goal Reviewed --  12/22/17   Strength Goal PT LTG, Outcome --  goal not met   Strength Goal PT LTG, Reason Goal Not Met --  discharged from facility     Goal: Physical Therapy Goal STG- PT  Outcome: Unable to achieve outcome(s) by discharge Date Met: 12/22/17 12/20/17 0914 12/22/17 1416   Physical Therapy PT STG   Physical Therapy PT STG, Date Established 12/20/17 --    Physical Therapy PT STG, Time to Achieve 2 - 3 days --    Physical Therapy PT STG, Activity Type be able to verbalize hip precautions and follow them --    Physical Therapy PT STG, [place] Level independent;verbal cues required --    Physical Therapy PT STG, Date Goal Reviewed --  12/22/17   Physical Therapy PT STG, Outcome --  goal not met   Physical Therapy PT STG, Reason Goal Not Met --  discharged from facility

## 2017-12-22 NOTE — PROGRESS NOTES
No c/o  Vitals:    12/22/17 0743   BP:    Pulse: 91   Resp:    Temp:    SpO2:    Hb 9.2 Hct 28  Alert, comfortable  Dressing intact   knee immobilizer in place left leg  Dorsiflexion and plantarflexion intact left left  Sensation intact to touch  No signs dvt/pe  Pulse present left leg  Weight bearing as tolerated, left leg, posterior hip precautions, wear knee immobilizer for non compliance.  Physical therapy  dvt prophylaxis  Nursing placement

## 2017-12-22 NOTE — PLAN OF CARE
Problem: Patient Care Overview (Adult)  Goal: Plan of Care Review  Outcome: Ongoing (interventions implemented as appropriate)      Problem: Inpatient Physical Therapy  Goal: Bed Mobility Goal STG- PT  Outcome: Ongoing (interventions implemented as appropriate)   12/20/17 0914 12/22/17 0914   Bed Mobility PT STG   Bed Mobility PT STG, Date Established 12/20/17 --    Bed Mobility PT STG, Time to Achieve 2 - 3 days --    Bed Mobility PT STG, Activity Type supine to sit/sit to supine --    Bed Mobility PT STG, [place] Level contact guard assist --    Transfer Training Goal, Assist Device bed rails --    Bed Mobility PT STG, Additional Goal HOB up and follwoing hip precautions --    Bed Mobility PT STG, Outcome --  goal ongoing     Goal: Transfer Training Goal 1 STG- PT  Outcome: Ongoing (interventions implemented as appropriate)   12/20/17 0914 12/22/17 0914   Transfer Training PT STG   Transfer Training PT STG, Date Established 12/20/17 --    Transfer Training PT STG, Time to Achieve 2 - 3 days --    Transfer Training PT STG, Activity Type bed to chair /chair to bed;sit to stand/stand to sit --    Transfer Training PT STG, [place] Level contact guard assist --    Transfer Training PT STG, Assist Device walker, rolling --    Transfer Training PT STG, Outcome --  goal ongoing     Goal: Gait Training Goal LTG- PT  Outcome: Ongoing (interventions implemented as appropriate)   12/20/17 0914 12/22/17 0914   Gait Training PT LTG   Gait Training Goal PT LTG, Date Established 12/20/17 --    Gait Training Goal PT LTG, Time to Achieve by discharge --    Gait Training Goal PT LTG, [place] Level contact guard assist --    Gait Training Goal PT LTG, Assist Device walker, rolling --    Gait Training Goal PT LTG, Distance to Achieve 100 feet --    Gait Training Goal PT LTG, Outcome --  goal ongoing     Goal: Strength Goal LTG- PT  Outcome: Ongoing (interventions implemented as appropriate)   12/20/17 0914 12/22/17 0914    Strength Goal PT LTG   Strength Goal PT LTG, Date Established 12/20/17 --    Strength Goal PT LTG, Time to Achieve by discharge --    Strength Goal PT LTG, Measure to Achieve 20 reps all ex BLE activiely --    Strength Goal PT LTG, Functional Goal get legs up onto bed following hip precautions --    Strength Goal PT LTG, Outcome --  goal ongoing     Goal: Physical Therapy Goal STG- PT  Outcome: Ongoing (interventions implemented as appropriate)   12/20/17 0914 12/21/17 1410   Physical Therapy PT STG   Physical Therapy PT STG, Date Established 12/20/17 --    Physical Therapy PT STG, Time to Achieve 2 - 3 days --    Physical Therapy PT STG, Activity Type be able to verbalize hip precautions and follow them --    Physical Therapy PT STG, [place] Level independent;verbal cues required --    Physical Therapy PT STG, Outcome --  goal ongoing

## 2017-12-22 NOTE — DISCHARGE SUMMARY
Discharge Summary  Dimitri Goldman MD  Hospitalist     Date of Discharge:  12/22/2017    Discharge Diagnosis:    Principal Problem:    Displaced fracture of base of neck of left femur, initial encounter for closed fracture  Active Problems:    Dementia with behavioral disturbance    Hypertension    Atrial fibrillation      Presenting Problem/History of Present Illness  L hip pain    Hospital Course  Patient is a 72 y.o. male admitted for L hip pain after a fall. He was diagnosed with a L femoral neck fracture requiring surgical fixation. He remains confused at baseline - he will continue with his anti psychotic medications as listed. He will continue with the anticoagulation, the PT/OT as tolerated at the Nursing Home.    Procedures Performed  Procedure(s):  HIP HEMIARTHROPLASTY    Consults:   Consults     Date and Time Order Name Status Description    12/21/2017 1104 Inpatient Consult to Psychiatrist Completed     12/19/2017 0607 Inpatient Consult to Orthopedic Surgery Completed     12/16/2017 0590 Inpatient Consult to Hospitalist Completed           Pertinent Test Results: radiology: X-Ray: Acute left subcapital femoral neck fracture    Condition on Discharge:  stable    Vital Signs  Temp:  [98.4 °F (36.9 °C)-100.6 °F (38.1 °C)] 100.6 °F (38.1 °C)  Heart Rate:  [] 101  Resp:  [16-20] 18  BP: (133-167)/(70-94) 137/94    Physical Exam:  Physical Exam   Constitutional: He appears well-developed and well-nourished. No distress.   HENT:   Head: Normocephalic and atraumatic.   Eyes: EOM are normal. Pupils are equal, round, and reactive to light. No scleral icterus.   Neck: Normal range of motion. Neck supple.   Cardiovascular: Normal rate and regular rhythm.    Pulmonary/Chest: Effort normal and breath sounds normal. No respiratory distress. He has no wheezes.   Abdominal: Soft. Bowel sounds are normal. He exhibits no distension. There is no tenderness.   Musculoskeletal: He exhibits tenderness (L hip). He exhibits  no edema or deformity.   Neurological: He is alert. He has normal reflexes. No cranial nerve deficit.   Skin: Skin is warm and dry. No rash noted. No erythema. No pallor.   Psychiatric:   Confused    Vitals reviewed.      Discharge Disposition  Nursing Facility (DC - External)    Discharge Medications   Nickolas Muhammad   Home Medication Instructions CHAD:[id number]    Printed on:12/22/17 8546   Medication Information                      albuterol (PROVENTIL HFA;VENTOLIN HFA) 108 (90 Base) MCG/ACT inhaler  Inhale 2 puffs Every 6 (Six) Hours As Needed for Wheezing.             amiodarone (PACERONE) 200 MG tablet  Take 200 mg by mouth Daily.             apixaban (ELIQUIS) 2.5 MG tablet tablet  Take 1 tablet by mouth Every 12 (Twelve) Hours.             calcium carbonate (TUMS) 500 MG chewable tablet  Chew 500 mg Daily As Needed for Heartburn.             Cholecalciferol (VITAMIN D3) 5000 units capsule capsule  Take 5,000 Units by mouth Daily.             HYDROcodone-acetaminophen (NORCO) 7.5-325 MG per tablet  Take 1 tablet by mouth Every 6 (Six) Hours As Needed for Moderate Pain  or Severe Pain .             levothyroxine (SYNTHROID, LEVOTHROID) 25 MCG tablet  Take 25 mcg by mouth Daily.             metoprolol succinate XL (TOPROL-XL) 25 MG 24 hr tablet  Take 12.5 mg by mouth Daily.             mirtazapine (REMERON) 30 MG tablet  Take 30 mg by mouth Every Night.             OLANZapine (zyPREXA) 5 MG tablet  Take 5 mg by mouth Every Night.             omeprazole (priLOSEC) 20 MG capsule  Take 20 mg by mouth 2 (Two) Times a Day.             polyethylene glycol (MIRALAX) packet  Take 17 g by mouth 2 (Two) Times a Day.             pravastatin (PRAVACHOL) 20 MG tablet  Take 20 mg by mouth Every Night.             senna (SENOKOT) 8.6 MG tablet tablet  Take  by mouth Every Night.             sertraline (ZOLOFT) 100 MG tablet  Take 100 mg by mouth 2 (Two) Times a Day.             tamsulosin (FLOMAX) 0.4 MG capsule 24 hr  capsule  Take 1 capsule by mouth Every Night.                 Discharge Diet:   Diet Instructions     Advance Diet As Tolerated                   As tolerated                  Activity at Discharge:   Activity Instructions     Activity as Tolerated               Additional Activity Instructions:      As tolerated, hip precautions Lt hip, wear immobilizer for noncompliance with hip precautions.                   Follow-up Appointments  No future appointments.  Additional Instructions for the Follow-ups that You Need to Schedule     Dr Talley 1 week  PCP 1 week               Test Results Pending at Discharge   Order Current Status    Blood Culture - Blood, Preliminary result    Blood Culture - Blood, Preliminary result           Dimitri Goldman MD  12/22/17  3:50 PM

## 2017-12-25 LAB
BACTERIA SPEC AEROBE CULT: NORMAL
BACTERIA SPEC AEROBE CULT: NORMAL

## 2017-12-26 DIAGNOSIS — S72.042A DISPLACED FRACTURE OF BASE OF NECK OF LEFT FEMUR, INITIAL ENCOUNTER FOR CLOSED FRACTURE (HCC): Primary | ICD-10-CM

## 2018-01-04 DIAGNOSIS — S72.042A DISPLACED FRACTURE OF BASE OF NECK OF LEFT FEMUR, INITIAL ENCOUNTER FOR CLOSED FRACTURE (HCC): Primary | ICD-10-CM

## 2018-01-10 ENCOUNTER — OFFICE VISIT (OUTPATIENT)
Dept: ORTHOPEDIC SURGERY | Facility: CLINIC | Age: 73
End: 2018-01-10

## 2018-01-10 VITALS — BODY MASS INDEX: 23.46 KG/M2 | HEIGHT: 66 IN | WEIGHT: 146 LBS

## 2018-01-10 DIAGNOSIS — S72.042A DISPLACED FRACTURE OF BASE OF NECK OF LEFT FEMUR, INITIAL ENCOUNTER FOR CLOSED FRACTURE (HCC): Primary | ICD-10-CM

## 2018-01-10 PROCEDURE — 99024 POSTOP FOLLOW-UP VISIT: CPT | Performed by: ORTHOPAEDIC SURGERY

## 2018-01-10 NOTE — PROGRESS NOTES
Postop Follow-up    Name:  Nickolas Muhammad  Date:  1/10/2018  :  1945    Chief Complaint:    Chief Complaint   Patient presents with   • Left Hip - Follow-up   • Wound Check   • Suture / Staple Removal     Date of surgery:    17 (22d) Javan Talley MD     HIP HEMIARTHROPLASTY - Left           History of Present Illness: post op left hip  X-rays done today in office     Doing well no pain.      Current Outpatient Prescriptions:   •  albuterol (PROVENTIL HFA;VENTOLIN HFA) 108 (90 Base) MCG/ACT inhaler, Inhale 2 puffs Every 6 (Six) Hours As Needed for Wheezing., Disp: , Rfl:   •  amiodarone (PACERONE) 200 MG tablet, Take 200 mg by mouth Daily., Disp: , Rfl:   •  apixaban (ELIQUIS) 2.5 MG tablet tablet, Take 1 tablet by mouth Every 12 (Twelve) Hours., Disp: , Rfl:   •  calcium carbonate (TUMS) 500 MG chewable tablet, Chew 500 mg Daily As Needed for Heartburn., Disp: , Rfl:   •  Cholecalciferol (VITAMIN D3) 5000 units capsule capsule, Take 5,000 Units by mouth Daily., Disp: , Rfl:   •  HYDROcodone-acetaminophen (NORCO) 7.5-325 MG per tablet, Take 1 tablet by mouth Every 6 (Six) Hours As Needed for Moderate Pain  or Severe Pain ., Disp: 30 tablet, Rfl: 0  •  levothyroxine (SYNTHROID, LEVOTHROID) 25 MCG tablet, Take 25 mcg by mouth Daily., Disp: , Rfl:   •  metoprolol succinate XL (TOPROL-XL) 25 MG 24 hr tablet, Take 12.5 mg by mouth Daily., Disp: , Rfl:   •  mirtazapine (REMERON) 30 MG tablet, Take 30 mg by mouth Every Night., Disp: , Rfl:   •  OLANZapine (zyPREXA) 5 MG tablet, Take 5 mg by mouth Every Night., Disp: , Rfl:   •  omeprazole (priLOSEC) 20 MG capsule, Take 20 mg by mouth 2 (Two) Times a Day., Disp: , Rfl:   •  polyethylene glycol (MIRALAX) packet, Take 17 g by mouth 2 (Two) Times a Day., Disp: , Rfl:   •  pravastatin (PRAVACHOL) 20 MG tablet, Take 20 mg by mouth Every Night., Disp: , Rfl:   •  senna (SENOKOT) 8.6 MG tablet tablet, Take  by mouth Every Night., Disp: , Rfl:   •   "sertraline (ZOLOFT) 100 MG tablet, Take 100 mg by mouth 2 (Two) Times a Day., Disp: , Rfl:   •  tamsulosin (FLOMAX) 0.4 MG capsule 24 hr capsule, Take 1 capsule by mouth Every Night., Disp: , Rfl:     No Known Allergies      Exam:  Vitals:    01/10/18 1020   Weight: 66.2 kg (146 lb)   Height: 167.6 cm (66\")       The patient is awake, alert, and oriented and in no apparent distress.    Right upper extremity:    Left upper extremity:    Right lower extremity:    Left lower extremity:  Incision healing well  In wheelchair  Knee immobilizer in place left leg.     Xr Chest 1 View    Result Date: 12/15/2017  Narrative: PORTABLE CHEST HISTORY: COPD. Portable AP upright film of the chest was obtained at 7:23 PM. COMPARISON: None Chronic obstructive pulmonary disease. Linear atelectasis or scar left midlung. No focal infiltrate. Postoperative changes in the sternum and mediastinum. Right-sided pacemaker with leads projected over the right atrium and right ventricle. The heart is not enlarged. The pulmonary vasculature is not increased. No pleural effusion. No pneumothorax. No acute osseous abnormality.     Impression: CONCLUSION: Chronic obstructive pulmonary disease. Linear atelectasis or scar left midlung. No focal infiltrate. Coronary artery bypass. Right-sided pacemaker. [id number] Electronically signed by:  Bj Armijo MD  12/15/2017 8:10 PM CST Workstation: [id number]    Xr Hip With Or Without Pelvis 1 View Left    Result Date: 12/19/2017  Narrative: Left hip single view on 12/19/2017 CLINICAL INDICATION: Status post total hip arthroplasty COMPARISON: 12/18/2017 FINDINGS: The patient is status post interval left total hip arthroplasty. Skin staples are noted in place. Expected air and edema is noted in the soft tissues. No hardware complication is noted on limited single view exam. No fracture is noted. There is no dislocation.     Impression: No acute abnormality noted on limited single view exam. Electronically " signed by:  Arnold Abbott  12/19/2017 10:22 PM CST Workstation: [id number]    Xr Hip With Or Without Pelvis 1 View Left    Result Date: 12/19/2017  Narrative: Pelvis and left hip total three view on 12/18/2017 CLINICAL INDICATION: Left hip pain after fall COMPARISON: None FINDINGS: There is an acute, oblique, displaced left subcapital femoral neck fracture. There is lateral and proximal displacement and varus angulation of the distal fracture fragment. The hips are well located. No other fracture is noted. The SI joints are well aligned.     Impression: Acute left subcapital femoral neck fracture. Electronically signed by:  Arnold Abbott  12/19/2017 12:32 AM CST Workstation: [id number]    Xr Hip With Or Without Pelvis 2 - 3 View Left    Result Date: 1/10/2018  Narrative: Xray of left hip AP and lateral view the left hip prosthesis in excellent position, hip is reduced, leg lengths correct. AP pelvis SI joint is normal.          Assessment:  Diagnoses and all orders for this visit:    Displaced fracture of base of neck of left femur, initial encounter for closed fracture          Plan:    Ambulation as tolerated, posterior hip precautions. Weight bearing as tolerated.    May discontinue knee immobilizer.   Continue eliquis through January.    No Follow-up on file.      01/10/18 at 10:21 AM by Javan Talley MD

## 2018-01-30 ENCOUNTER — TELEPHONE (OUTPATIENT)
Dept: ORTHOPEDIC SURGERY | Facility: CLINIC | Age: 73
End: 2018-01-30

## 2020-01-28 NOTE — NURSING NOTE
Due for HPV vaccine #3  Attempted to contact the patient to notify her- vm box full unable to leave a message.      Wife contacted regarding plan for surgery on broken hip this afternoon. Wife unaware of the patient being transferred to room 323 last noc and of broken hip. Wife updated on patient status and plan of care. Wife requesting for patient to be transferred to [facility] for surgery. Patient made aware of wife's request, pt refuses transfer. Pt is alert and oriented x4, states he wants to stay here and have surgery. Wife requests for RN to contact pt's brother, Cameron. Call made to listed phone number, no answer, message left on answering machine to call REGINA Medina and phone number provided. Case management consult entered (per verbal order from Dr. James) for family concerns. Awaiting return phone call from brother, wife updated of situation. Patient consents to surgery and plan of care, as discussed.

## 2021-12-17 NOTE — NURSING NOTE
GENERAL PRE-PROCEDURE:   Procedure:  Port replacement, venoplasty, and/or fibrin disruption  Date/Time:  12/17/2021 10:57 AM    Verbal consent obtained?: Yes    Written consent obtained?: Yes    Risks and benefits: Risks, benefits and alternatives were discussed    Consent given by:  Patient  Patient states understanding of procedure being performed: Yes    Patient's understanding of procedure matches consent: Yes    Procedure consent matches procedure scheduled: Yes    Expected level of sedation:  Moderate  Appropriately NPO:  Yes  ASA Class:  2  Mallampati  :  Grade 1- soft palate, uvula, tonsillar pillars, and posterior pharyngeal wall visible  Lungs:  Lungs clear with good breath sounds bilaterally  Heart:  Normal heart sounds and rate  History & Physical reviewed:  History and physical reviewed and no updates needed  Statement of review:  I have reviewed the lab findings, diagnostic data, medications, and the plan for sedation     Patient took foot board off of the bed and slammed it on the floor. It was a very loud noise whenever it hit the floor and it startled the patients in the dayroom. Patient was redirected and asked to lay down for the night. I advised the MD and he gave me some new orders.

## 2022-10-19 NOTE — NURSING NOTE
Pt is alert, oriented to self. Has walked around unit today and used wheelchair at times. Did talk to wife earlier today. Played horseshoes with the other people on his unit. No outbursts of aggression.    Detail Level: Zone Quality 226: Preventive Care And Screening: Tobacco Use: Screening And Cessation Intervention: Patient screened for tobacco use and is an ex/non-smoker Quality 110: Preventive Care And Screening: Influenza Immunization: Influenza Immunization previously received during influenza season Quality 431: Preventive Care And Screening: Unhealthy Alcohol Use - Screening: Patient not identified as an unhealthy alcohol user when screened for unhealthy alcohol use using a systematic screening method

## (undated) DEVICE — GLV SURG SENSICARE MICRO PF LF 7.5 STRL

## (undated) DEVICE — HEWSON SUTURE RETRIEVER: Brand: HEWSON SUTURE RETRIEVER

## (undated) DEVICE — UNDRPD BREATH 23X36 BG/10

## (undated) DEVICE — STPLR SKIN VISISTAT WD 35CT

## (undated) DEVICE — STRYKER PERFORMANCE SERIES SAGITTAL BLADE: Brand: STRYKER PERFORMANCE SERIES

## (undated) DEVICE — BNDG ELAS ELITE V/CLOSE 6IN 5YD LF STRL

## (undated) DEVICE — SUT VIC 2 CP 27IN UD VCP195H

## (undated) DEVICE — DRSNG WND BORDR/ADHS NONADHR/GZ LF 4X10IN STRL

## (undated) DEVICE — SUT VICRYL O M0-4 27IN ETHCPP71D

## (undated) DEVICE — BNDG ELAS CO-FLEX SLF ADHR 4IN5YD LF STRL

## (undated) DEVICE — GLV SURG TRIUMPH ORTHO W/ALOE PF LTX 7.0

## (undated) DEVICE — TOWEL,OR,DSP,ST,BLUE,DLX,8/PK,10PK/CS: Brand: MEDLINE

## (undated) DEVICE — TP ELAS ELASTIKON ADHS 4IN 2.5YD

## (undated) DEVICE — GLV SURG SENSICARE GREEN W/ALOE PF LF 8 STRL

## (undated) DEVICE — GLV SURG SENSICARE GREEN W/ALOE PF LF 6.5 STRL

## (undated) DEVICE — SUT VIC 2/0 CT1 36IN

## (undated) DEVICE — PEEL-AWAY TOGA, 2X LARGE: Brand: FLYTE

## (undated) DEVICE — PK HIP LF 60

## (undated) DEVICE — COVER,MAYO STAND,STERILE: Brand: MEDLINE

## (undated) DEVICE — SUT VICRYL 1 CT1 27IN J261H

## (undated) DEVICE — HANDPIECE SET WITH COAXIAL HIGH FLOW TIP AND SUCTION TUBE: Brand: INTERPULSE

## (undated) DEVICE — PAD,ABDOMINAL,8"X10",ST,LF: Brand: MEDLINE

## (undated) DEVICE — HOOD, PEEL-AWAY: Brand: FLYTE

## (undated) DEVICE — DRAPE,U/ SHT,SPLIT,PLAS,STERIL: Brand: MEDLINE

## (undated) DEVICE — STCKNT IMPERV 12IN STRL

## (undated) DEVICE — NDL SUT TONSL DAVIS 1/2 CIR 1849D

## (undated) DEVICE — CONN TBG Y 5 IN 1 LF STRL

## (undated) DEVICE — VIOLET BRAIDED (POLYGLACTIN 910), SYNTHETIC ABSORBABLE SUTURE: Brand: COATED VICRYL

## (undated) DEVICE — SPNG LAP 18X18IN LF STRL PK/5

## (undated) DEVICE — NDL SUT CATGUT 1/2CIR TPR SZ3 2PK 1824-3D

## (undated) DEVICE — GLV SURG SENSICARE ALOE LF PF SZ7.5 GRN

## (undated) DEVICE — ELECTRD BLD STD SS 3/32X6.5IN

## (undated) DEVICE — SOL IRR NACL 0.9PCT BT 1000ML

## (undated) DEVICE — CAUTERY TIP POLISHER: Brand: DEVON